# Patient Record
Sex: FEMALE | Race: OTHER | HISPANIC OR LATINO
[De-identification: names, ages, dates, MRNs, and addresses within clinical notes are randomized per-mention and may not be internally consistent; named-entity substitution may affect disease eponyms.]

---

## 2017-09-15 ENCOUNTER — APPOINTMENT (OUTPATIENT)
Dept: VASCULAR SURGERY | Facility: CLINIC | Age: 42
End: 2017-09-15
Payer: MEDICAID

## 2017-09-15 VITALS
HEART RATE: 75 BPM | HEIGHT: 63 IN | TEMPERATURE: 75 F | RESPIRATION RATE: 14 BRPM | DIASTOLIC BLOOD PRESSURE: 65 MMHG | WEIGHT: 220 LBS | SYSTOLIC BLOOD PRESSURE: 103 MMHG | BODY MASS INDEX: 38.98 KG/M2 | OXYGEN SATURATION: 97 %

## 2017-09-15 PROCEDURE — 93970 EXTREMITY STUDY: CPT

## 2017-09-15 PROCEDURE — 99203 OFFICE O/P NEW LOW 30 MIN: CPT | Mod: 25

## 2017-09-15 RX ORDER — BUDESONIDE AND FORMOTEROL FUMARATE DIHYDRATE 160; 4.5 UG/1; UG/1
AEROSOL RESPIRATORY (INHALATION)
Refills: 0 | Status: ACTIVE | COMMUNITY

## 2017-10-19 ENCOUNTER — APPOINTMENT (OUTPATIENT)
Dept: ANTEPARTUM | Facility: CLINIC | Age: 42
End: 2017-10-19

## 2017-11-21 ENCOUNTER — APPOINTMENT (OUTPATIENT)
Dept: VASCULAR SURGERY | Facility: CLINIC | Age: 42
End: 2017-11-21
Payer: MEDICAID

## 2017-11-21 VITALS
HEART RATE: 74 BPM | RESPIRATION RATE: 14 BRPM | SYSTOLIC BLOOD PRESSURE: 93 MMHG | DIASTOLIC BLOOD PRESSURE: 63 MMHG | TEMPERATURE: 98.2 F | OXYGEN SATURATION: 99 %

## 2017-11-21 DIAGNOSIS — Z82.49 FAMILY HISTORY OF ISCHEMIC HEART DISEASE AND OTHER DISEASES OF THE CIRCULATORY SYSTEM: ICD-10-CM

## 2017-11-21 DIAGNOSIS — Z83.3 FAMILY HISTORY OF DIABETES MELLITUS: ICD-10-CM

## 2017-11-21 PROCEDURE — 99214 OFFICE O/P EST MOD 30 MIN: CPT

## 2017-12-08 ENCOUNTER — APPOINTMENT (OUTPATIENT)
Dept: VASCULAR SURGERY | Facility: CLINIC | Age: 42
End: 2017-12-08

## 2018-02-09 ENCOUNTER — EMERGENCY (EMERGENCY)
Facility: HOSPITAL | Age: 43
LOS: 1 days | Discharge: HOME | End: 2018-02-09
Attending: EMERGENCY MEDICINE
Payer: MEDICAID

## 2018-02-09 VITALS
WEIGHT: 229.06 LBS | RESPIRATION RATE: 20 BRPM | DIASTOLIC BLOOD PRESSURE: 89 MMHG | OXYGEN SATURATION: 99 % | HEART RATE: 86 BPM | SYSTOLIC BLOOD PRESSURE: 144 MMHG | TEMPERATURE: 98 F | HEIGHT: 63 IN

## 2018-02-09 DIAGNOSIS — Z98.89 OTHER SPECIFIED POSTPROCEDURAL STATES: Chronic | ICD-10-CM

## 2018-02-09 LAB
ALBUMIN SERPL ELPH-MCNC: 4.3 G/DL — SIGNIFICANT CHANGE UP (ref 3.3–5.2)
ALP SERPL-CCNC: 82 U/L — SIGNIFICANT CHANGE UP (ref 40–120)
ALT FLD-CCNC: 15 U/L — SIGNIFICANT CHANGE UP
ANION GAP SERPL CALC-SCNC: 13 MMOL/L — SIGNIFICANT CHANGE UP (ref 5–17)
APPEARANCE UR: CLEAR — SIGNIFICANT CHANGE UP
AST SERPL-CCNC: 18 U/L — SIGNIFICANT CHANGE UP
BASOPHILS # BLD AUTO: 0 K/UL — SIGNIFICANT CHANGE UP (ref 0–0.2)
BASOPHILS NFR BLD AUTO: 0.2 % — SIGNIFICANT CHANGE UP (ref 0–2)
BILIRUB SERPL-MCNC: <0.2 MG/DL — LOW (ref 0.4–2)
BILIRUB UR-MCNC: NEGATIVE — SIGNIFICANT CHANGE UP
BUN SERPL-MCNC: 15 MG/DL — SIGNIFICANT CHANGE UP (ref 8–20)
CALCIUM SERPL-MCNC: 9.1 MG/DL — SIGNIFICANT CHANGE UP (ref 8.6–10.2)
CHLORIDE SERPL-SCNC: 102 MMOL/L — SIGNIFICANT CHANGE UP (ref 98–107)
CO2 SERPL-SCNC: 25 MMOL/L — SIGNIFICANT CHANGE UP (ref 22–29)
COLOR SPEC: YELLOW — SIGNIFICANT CHANGE UP
CREAT SERPL-MCNC: 0.57 MG/DL — SIGNIFICANT CHANGE UP (ref 0.5–1.3)
DIFF PNL FLD: NEGATIVE — SIGNIFICANT CHANGE UP
EOSINOPHIL # BLD AUTO: 0.1 K/UL — SIGNIFICANT CHANGE UP (ref 0–0.5)
EOSINOPHIL NFR BLD AUTO: 1 % — SIGNIFICANT CHANGE UP (ref 0–6)
GLUCOSE SERPL-MCNC: 110 MG/DL — SIGNIFICANT CHANGE UP (ref 70–115)
GLUCOSE UR QL: NEGATIVE MG/DL — SIGNIFICANT CHANGE UP
HCG SERPL-ACNC: <5 MIU/ML — SIGNIFICANT CHANGE UP
HCT VFR BLD CALC: 37.4 % — SIGNIFICANT CHANGE UP (ref 37–47)
HGB BLD-MCNC: 12 G/DL — SIGNIFICANT CHANGE UP (ref 12–16)
KETONES UR-MCNC: NEGATIVE — SIGNIFICANT CHANGE UP
LEUKOCYTE ESTERASE UR-ACNC: NEGATIVE — SIGNIFICANT CHANGE UP
LYMPHOCYTES # BLD AUTO: 15.9 % — LOW (ref 20–55)
LYMPHOCYTES # BLD AUTO: 2.1 K/UL — SIGNIFICANT CHANGE UP (ref 1–4.8)
MCHC RBC-ENTMCNC: 25 PG — LOW (ref 27–31)
MCHC RBC-ENTMCNC: 32.1 G/DL — SIGNIFICANT CHANGE UP (ref 32–36)
MCV RBC AUTO: 77.9 FL — LOW (ref 81–99)
MONOCYTES # BLD AUTO: 0.7 K/UL — SIGNIFICANT CHANGE UP (ref 0–0.8)
MONOCYTES NFR BLD AUTO: 5.4 % — SIGNIFICANT CHANGE UP (ref 3–10)
NEUTROPHILS # BLD AUTO: 10.2 K/UL — HIGH (ref 1.8–8)
NEUTROPHILS NFR BLD AUTO: 77.2 % — HIGH (ref 37–73)
NITRITE UR-MCNC: NEGATIVE — SIGNIFICANT CHANGE UP
PH UR: 6 — SIGNIFICANT CHANGE UP (ref 5–8)
PLATELET # BLD AUTO: 330 K/UL — SIGNIFICANT CHANGE UP (ref 150–400)
POTASSIUM SERPL-MCNC: 4.5 MMOL/L — SIGNIFICANT CHANGE UP (ref 3.5–5.3)
POTASSIUM SERPL-SCNC: 4.5 MMOL/L — SIGNIFICANT CHANGE UP (ref 3.5–5.3)
PROT SERPL-MCNC: 7 G/DL — SIGNIFICANT CHANGE UP (ref 6.6–8.7)
PROT UR-MCNC: NEGATIVE MG/DL — SIGNIFICANT CHANGE UP
RBC # BLD: 4.8 M/UL — SIGNIFICANT CHANGE UP (ref 4.4–5.2)
RBC # FLD: 14.4 % — SIGNIFICANT CHANGE UP (ref 11–15.6)
SODIUM SERPL-SCNC: 140 MMOL/L — SIGNIFICANT CHANGE UP (ref 135–145)
SP GR SPEC: 1.01 — SIGNIFICANT CHANGE UP (ref 1.01–1.02)
UROBILINOGEN FLD QL: NEGATIVE MG/DL — SIGNIFICANT CHANGE UP
WBC # BLD: 13.2 K/UL — HIGH (ref 4.8–10.8)
WBC # FLD AUTO: 13.2 K/UL — HIGH (ref 4.8–10.8)

## 2018-02-09 PROCEDURE — 99285 EMERGENCY DEPT VISIT HI MDM: CPT

## 2018-02-09 PROCEDURE — 76856 US EXAM PELVIC COMPLETE: CPT | Mod: 26

## 2018-02-09 PROCEDURE — 76830 TRANSVAGINAL US NON-OB: CPT | Mod: 26

## 2018-02-09 RX ORDER — KETOROLAC TROMETHAMINE 30 MG/ML
15 SYRINGE (ML) INJECTION ONCE
Qty: 0 | Refills: 0 | Status: DISCONTINUED | OUTPATIENT
Start: 2018-02-09 | End: 2018-02-09

## 2018-02-09 RX ADMIN — Medication 15 MILLIGRAM(S): at 20:24

## 2018-02-09 NOTE — ED STATDOCS - ATTENDING CONTRIBUTION TO CARE
I, Bruna Cantrell, performed the initial face to face bedside interview with this patient regarding history of present illness, review of symptoms and relevant past medical, social and family history.  I completed an independent physical examination.  I was the initial provider who evaluated this patient. I have signed out the follow up of any pending tests (i.e. labs, radiological studies) to the ACP.  I have communicated the patient’s plan of care and disposition with the ACP.

## 2018-02-09 NOTE — ED ADULT TRIAGE NOTE - CHIEF COMPLAINT QUOTE
abd. pain x 3 weeks, went to gyn was sent for sono and ct scan  yesterday but doesn't have result, states cant wait pain is  too much

## 2018-02-09 NOTE — ED STATDOCS - PHYSICAL EXAMINATION
Const: Awake, alert and oriented. In no acute distress. Well appearing.  HEENT: NC/AT. Moist mucous membranes.  Eyes: No scleral icterus. EOMI.  Neck:. Soft and supple. Full ROM without pain.  Cardiac: Regular rate and rhythm. +S1/S2. No murmurs. Peripheral pulses 2+ and symmetric. No LE edema.  Resp: Speaking in full sentences. No evidence of respiratory distress. No wheezes, rales or rhonchi.  Abd: Soft, non-distended. + suprapubic and RLQ tenderness to palpation. Normal bowel sounds in all 4 quadrants. No guarding or rebound.  Back: Spine midline and non-tender. No CVAT.  Skin: No rashes, abrasions or lacerations.  Lymph: No cervical lymphadenopathy.

## 2018-02-09 NOTE — ED STATDOCS - NS ED ROS FT
Const: Denies fever, chills  HEENT: Denies blurry vision, sore throat  Neck: Denies neck pain/stiffness  Resp: Denies coughing, SOB  Cardiovascular: Denies CP, palpitations, LE edema  GI: + Abdominal pain, + diarrhea (watery - resolved) Denies nausea, vomiting, constipation, blood in stool  : Denies urinary frequency/urgency/dysuria, hematuria  MSK: Denies back pain  Neuro: Denies HA, dizziness, numbness, weakness  Skin: Denies rashes.

## 2018-02-09 NOTE — ED STATDOCS - PROGRESS NOTE DETAILS
Pt tolerated Burger Mirza, no complaints at this time. US results discussed with PT. Pt pain decreased with medication. PT reassessed, abdomen benign non tender, soft. results discussed with PT. PT states she has close follow up with Segun KOO and will see MD tomorrow. PT verbalized understanding of diagnosis and importance of follow up at [facility]D. PT educated on importance of follow up and when to return to the ED.

## 2018-02-09 NOTE — ED STATDOCS - OBJECTIVE STATEMENT
Patient with PMH anemia (never requiring blood transfusions), and DM presents complaining of 3 weeks of suprapubic and RLQ abdominal pain which has been persistent, not worsening. She saw her GYN who had an outpatient CT performed yesterday and recommended that the patient follow up in the office on Monday for the results, however the patient feels she cannot wait that long. Her pain is mildly improved with Advil 600 mg q6 h. She notes 2 days of watery diarrhea yesterday, denies fevers, nausea, vomiting, change in appetite, urinary symptoms, back pain, vaginal bleeding or discharge. She notes intolerance of percocet (vomiting), denies smoking, EtOH or illicit drugs.

## 2018-02-09 NOTE — ED STATDOCS - MEDICAL DECISION MAKING DETAILS
Patient presents with 3 weeks of suprapubic/RLQ abdominal pain which has been persistent, no fevers, vomiting, but did have watery diarrhea yesterday. Will check labs, do CT A/P as well as US pelvic and give toradol for pain.

## 2018-02-10 LAB
CULTURE RESULTS: SIGNIFICANT CHANGE UP
SPECIMEN SOURCE: SIGNIFICANT CHANGE UP

## 2018-02-10 PROCEDURE — 74177 CT ABD & PELVIS W/CONTRAST: CPT

## 2018-02-10 PROCEDURE — 81001 URINALYSIS AUTO W/SCOPE: CPT

## 2018-02-10 PROCEDURE — 99284 EMERGENCY DEPT VISIT MOD MDM: CPT | Mod: 25

## 2018-02-10 PROCEDURE — 85027 COMPLETE CBC AUTOMATED: CPT

## 2018-02-10 PROCEDURE — 76856 US EXAM PELVIC COMPLETE: CPT

## 2018-02-10 PROCEDURE — 87086 URINE CULTURE/COLONY COUNT: CPT

## 2018-02-10 PROCEDURE — 96374 THER/PROPH/DIAG INJ IV PUSH: CPT

## 2018-02-10 PROCEDURE — 80053 COMPREHEN METABOLIC PANEL: CPT

## 2018-02-10 PROCEDURE — 74177 CT ABD & PELVIS W/CONTRAST: CPT | Mod: 26

## 2018-02-10 PROCEDURE — 84702 CHORIONIC GONADOTROPIN TEST: CPT

## 2018-02-10 PROCEDURE — 76830 TRANSVAGINAL US NON-OB: CPT

## 2018-02-10 PROCEDURE — 36415 COLL VENOUS BLD VENIPUNCTURE: CPT

## 2018-03-01 ENCOUNTER — OUTPATIENT (OUTPATIENT)
Dept: OUTPATIENT SERVICES | Facility: HOSPITAL | Age: 43
LOS: 1 days | End: 2018-03-01
Payer: MEDICAID

## 2018-03-01 DIAGNOSIS — Z98.89 OTHER SPECIFIED POSTPROCEDURAL STATES: Chronic | ICD-10-CM

## 2018-03-01 PROCEDURE — G9001: CPT

## 2018-03-22 ENCOUNTER — OUTPATIENT (OUTPATIENT)
Dept: OUTPATIENT SERVICES | Facility: HOSPITAL | Age: 43
LOS: 1 days | End: 2018-03-22
Payer: MEDICAID

## 2018-03-22 VITALS
SYSTOLIC BLOOD PRESSURE: 118 MMHG | TEMPERATURE: 98 F | HEIGHT: 63 IN | WEIGHT: 220.46 LBS | HEART RATE: 88 BPM | DIASTOLIC BLOOD PRESSURE: 80 MMHG | RESPIRATION RATE: 18 BRPM

## 2018-03-22 DIAGNOSIS — Z87.42 PERSONAL HISTORY OF OTHER DISEASES OF THE FEMALE GENITAL TRACT: ICD-10-CM

## 2018-03-22 DIAGNOSIS — Z90.49 ACQUIRED ABSENCE OF OTHER SPECIFIED PARTS OF DIGESTIVE TRACT: Chronic | ICD-10-CM

## 2018-03-22 DIAGNOSIS — Z01.818 ENCOUNTER FOR OTHER PREPROCEDURAL EXAMINATION: ICD-10-CM

## 2018-03-22 DIAGNOSIS — Z98.89 OTHER SPECIFIED POSTPROCEDURAL STATES: Chronic | ICD-10-CM

## 2018-03-22 LAB
ANION GAP SERPL CALC-SCNC: 12 MMOL/L — SIGNIFICANT CHANGE UP (ref 5–17)
APTT BLD: 28 SEC — SIGNIFICANT CHANGE UP (ref 27.5–37.4)
BASOPHILS # BLD AUTO: 0 K/UL — SIGNIFICANT CHANGE UP (ref 0–0.2)
BASOPHILS NFR BLD AUTO: 0.1 % — SIGNIFICANT CHANGE UP (ref 0–2)
BLD GP AB SCN SERPL QL: SIGNIFICANT CHANGE UP
BUN SERPL-MCNC: 13 MG/DL — SIGNIFICANT CHANGE UP (ref 8–20)
CALCIUM SERPL-MCNC: 9.5 MG/DL — SIGNIFICANT CHANGE UP (ref 8.6–10.2)
CHLORIDE SERPL-SCNC: 99 MMOL/L — SIGNIFICANT CHANGE UP (ref 98–107)
CO2 SERPL-SCNC: 25 MMOL/L — SIGNIFICANT CHANGE UP (ref 22–29)
CREAT SERPL-MCNC: 0.47 MG/DL — LOW (ref 0.5–1.3)
EOSINOPHIL # BLD AUTO: 0.2 K/UL — SIGNIFICANT CHANGE UP (ref 0–0.5)
EOSINOPHIL NFR BLD AUTO: 1.5 % — SIGNIFICANT CHANGE UP (ref 0–6)
GLUCOSE SERPL-MCNC: 104 MG/DL — SIGNIFICANT CHANGE UP (ref 70–115)
HCT VFR BLD CALC: 39 % — SIGNIFICANT CHANGE UP (ref 37–47)
HGB BLD-MCNC: 12.3 G/DL — SIGNIFICANT CHANGE UP (ref 12–16)
INR BLD: 1.04 RATIO — SIGNIFICANT CHANGE UP (ref 0.88–1.16)
LYMPHOCYTES # BLD AUTO: 1.8 K/UL — SIGNIFICANT CHANGE UP (ref 1–4.8)
LYMPHOCYTES # BLD AUTO: 18.2 % — LOW (ref 20–55)
MCHC RBC-ENTMCNC: 24.8 PG — LOW (ref 27–31)
MCHC RBC-ENTMCNC: 31.5 G/DL — LOW (ref 32–36)
MCV RBC AUTO: 78.6 FL — LOW (ref 81–99)
MONOCYTES # BLD AUTO: 0.5 K/UL — SIGNIFICANT CHANGE UP (ref 0–0.8)
MONOCYTES NFR BLD AUTO: 5.4 % — SIGNIFICANT CHANGE UP (ref 3–10)
NEUTROPHILS # BLD AUTO: 7.4 K/UL — SIGNIFICANT CHANGE UP (ref 1.8–8)
NEUTROPHILS NFR BLD AUTO: 74.4 % — HIGH (ref 37–73)
PLATELET # BLD AUTO: 300 K/UL — SIGNIFICANT CHANGE UP (ref 150–400)
POTASSIUM SERPL-MCNC: 4.4 MMOL/L — SIGNIFICANT CHANGE UP (ref 3.5–5.3)
POTASSIUM SERPL-SCNC: 4.4 MMOL/L — SIGNIFICANT CHANGE UP (ref 3.5–5.3)
PROTHROM AB SERPL-ACNC: 11.4 SEC — SIGNIFICANT CHANGE UP (ref 9.8–12.7)
RBC # BLD: 4.96 M/UL — SIGNIFICANT CHANGE UP (ref 4.4–5.2)
RBC # FLD: 14.8 % — SIGNIFICANT CHANGE UP (ref 11–15.6)
SODIUM SERPL-SCNC: 136 MMOL/L — SIGNIFICANT CHANGE UP (ref 135–145)
TYPE + AB SCN PNL BLD: SIGNIFICANT CHANGE UP
WBC # BLD: 10 K/UL — SIGNIFICANT CHANGE UP (ref 4.8–10.8)
WBC # FLD AUTO: 10 K/UL — SIGNIFICANT CHANGE UP (ref 4.8–10.8)

## 2018-03-22 PROCEDURE — G0463: CPT

## 2018-03-22 PROCEDURE — 36415 COLL VENOUS BLD VENIPUNCTURE: CPT

## 2018-03-22 PROCEDURE — 86850 RBC ANTIBODY SCREEN: CPT

## 2018-03-22 PROCEDURE — 85610 PROTHROMBIN TIME: CPT

## 2018-03-22 PROCEDURE — 80048 BASIC METABOLIC PNL TOTAL CA: CPT

## 2018-03-22 PROCEDURE — 85730 THROMBOPLASTIN TIME PARTIAL: CPT

## 2018-03-22 PROCEDURE — 86901 BLOOD TYPING SEROLOGIC RH(D): CPT

## 2018-03-22 PROCEDURE — 85027 COMPLETE CBC AUTOMATED: CPT

## 2018-03-22 PROCEDURE — 86900 BLOOD TYPING SEROLOGIC ABO: CPT

## 2018-03-22 RX ORDER — CEFAZOLIN SODIUM 1 G
2000 VIAL (EA) INJECTION ONCE
Qty: 0 | Refills: 0 | Status: DISCONTINUED | OUTPATIENT
Start: 2018-03-27 | End: 2018-03-31

## 2018-03-22 RX ORDER — SODIUM CHLORIDE 9 MG/ML
3 INJECTION INTRAMUSCULAR; INTRAVENOUS; SUBCUTANEOUS EVERY 8 HOURS
Qty: 0 | Refills: 0 | Status: DISCONTINUED | OUTPATIENT
Start: 2018-03-28 | End: 2018-03-31

## 2018-03-22 NOTE — H&P PST ADULT - ASSESSMENT
43 year old female present to [facility] for total abdominal hysterectomy.

## 2018-03-22 NOTE — H&P PST ADULT - ATTENDING COMMENTS
Pt was seen and evaluated.  She desires TAHBSO  risks of surgery were discussed  pt does not want any further surgery and desires that everything be removed  pt understands the menopause risks as well

## 2018-03-22 NOTE — H&P PST ADULT - NSANTHOSAYNRD_GEN_A_CORE
No. TOMMY screening performed.  STOP BANG Legend: 0-2 = LOW Risk; 3-4 = INTERMEDIATE Risk; 5-8 = HIGH Risk

## 2018-03-22 NOTE — H&P PST ADULT - PSH
History of  section  . ,   History of endometrial ablation  () History of  section  . ,   History of endometrial ablation  ()  S/P appendectomy    S/P cholecystectomy

## 2018-03-27 ENCOUNTER — RESULT REVIEW (OUTPATIENT)
Age: 43
End: 2018-03-27

## 2018-03-27 ENCOUNTER — INPATIENT (INPATIENT)
Facility: HOSPITAL | Age: 43
LOS: 3 days | Discharge: HOME | DRG: 743 | End: 2018-03-31
Attending: OBSTETRICS & GYNECOLOGY | Admitting: OBSTETRICS & GYNECOLOGY
Payer: MEDICAID

## 2018-03-27 VITALS
TEMPERATURE: 99 F | WEIGHT: 220.46 LBS | OXYGEN SATURATION: 100 % | HEART RATE: 95 BPM | DIASTOLIC BLOOD PRESSURE: 66 MMHG | SYSTOLIC BLOOD PRESSURE: 125 MMHG | HEIGHT: 63 IN | RESPIRATION RATE: 18 BRPM

## 2018-03-27 DIAGNOSIS — Z90.49 ACQUIRED ABSENCE OF OTHER SPECIFIED PARTS OF DIGESTIVE TRACT: Chronic | ICD-10-CM

## 2018-03-27 DIAGNOSIS — R10.2 PELVIC AND PERINEAL PAIN: ICD-10-CM

## 2018-03-27 DIAGNOSIS — Z98.89 OTHER SPECIFIED POSTPROCEDURAL STATES: Chronic | ICD-10-CM

## 2018-03-27 LAB
GLUCOSE BLDC GLUCOMTR-MCNC: 109 MG/DL — HIGH (ref 70–99)
GLUCOSE BLDC GLUCOMTR-MCNC: 129 MG/DL — HIGH (ref 70–99)
GLUCOSE BLDC GLUCOMTR-MCNC: 145 MG/DL — HIGH (ref 70–99)

## 2018-03-27 PROCEDURE — 88305 TISSUE EXAM BY PATHOLOGIST: CPT | Mod: 26

## 2018-03-27 PROCEDURE — 88307 TISSUE EXAM BY PATHOLOGIST: CPT | Mod: 26

## 2018-03-27 PROCEDURE — 58180 PARTIAL HYSTERECTOMY: CPT | Mod: 80

## 2018-03-27 RX ORDER — SODIUM CHLORIDE 9 MG/ML
1000 INJECTION, SOLUTION INTRAVENOUS
Qty: 0 | Refills: 0 | Status: DISCONTINUED | OUTPATIENT
Start: 2018-03-28 | End: 2018-03-30

## 2018-03-27 RX ORDER — FENTANYL CITRATE 50 UG/ML
50 INJECTION INTRAVENOUS
Qty: 0 | Refills: 0 | Status: DISCONTINUED | OUTPATIENT
Start: 2018-03-27 | End: 2018-03-27

## 2018-03-27 RX ORDER — ALBUTEROL 90 UG/1
2.5 AEROSOL, METERED ORAL EVERY 6 HOURS
Qty: 0 | Refills: 0 | Status: DISCONTINUED | OUTPATIENT
Start: 2018-03-27 | End: 2018-03-31

## 2018-03-27 RX ORDER — IPRATROPIUM/ALBUTEROL SULFATE 18-103MCG
3 AEROSOL WITH ADAPTER (GRAM) INHALATION EVERY 12 HOURS
Qty: 0 | Refills: 0 | Status: DISCONTINUED | OUTPATIENT
Start: 2018-03-27 | End: 2018-03-31

## 2018-03-27 RX ORDER — SODIUM CHLORIDE 9 MG/ML
1000 INJECTION, SOLUTION INTRAVENOUS
Qty: 0 | Refills: 0 | Status: DISCONTINUED | OUTPATIENT
Start: 2018-03-27 | End: 2018-03-27

## 2018-03-27 RX ORDER — TRAMADOL HYDROCHLORIDE 50 MG/1
50 TABLET ORAL EVERY 4 HOURS
Qty: 0 | Refills: 0 | Status: DISCONTINUED | OUTPATIENT
Start: 2018-03-28 | End: 2018-03-31

## 2018-03-27 RX ORDER — ONDANSETRON 8 MG/1
4 TABLET, FILM COATED ORAL ONCE
Qty: 0 | Refills: 0 | Status: DISCONTINUED | OUTPATIENT
Start: 2018-03-27 | End: 2018-03-27

## 2018-03-27 RX ORDER — FENTANYL CITRATE 50 UG/ML
30 INJECTION INTRAVENOUS
Qty: 0 | Refills: 0 | Status: DISCONTINUED | OUTPATIENT
Start: 2018-03-27 | End: 2018-03-27

## 2018-03-27 RX ORDER — IBUPROFEN 200 MG
600 TABLET ORAL EVERY 6 HOURS
Qty: 0 | Refills: 0 | Status: DISCONTINUED | OUTPATIENT
Start: 2018-03-28 | End: 2018-03-31

## 2018-03-27 RX ADMIN — FENTANYL CITRATE 30 MILLILITER(S): 50 INJECTION INTRAVENOUS at 19:12

## 2018-03-27 RX ADMIN — FENTANYL CITRATE 50 MICROGRAM(S): 50 INJECTION INTRAVENOUS at 18:03

## 2018-03-27 RX ADMIN — FENTANYL CITRATE 50 MICROGRAM(S): 50 INJECTION INTRAVENOUS at 17:10

## 2018-03-27 RX ADMIN — FENTANYL CITRATE 50 MICROGRAM(S): 50 INJECTION INTRAVENOUS at 18:10

## 2018-03-27 RX ADMIN — FENTANYL CITRATE 30 MILLILITER(S): 50 INJECTION INTRAVENOUS at 18:51

## 2018-03-27 RX ADMIN — Medication 3 MILLILITER(S): at 20:28

## 2018-03-27 RX ADMIN — FENTANYL CITRATE 50 MICROGRAM(S): 50 INJECTION INTRAVENOUS at 17:15

## 2018-03-27 RX ADMIN — FENTANYL CITRATE 50 MICROGRAM(S): 50 INJECTION INTRAVENOUS at 17:30

## 2018-03-27 RX ADMIN — FENTANYL CITRATE 30 MILLILITER(S): 50 INJECTION INTRAVENOUS at 17:11

## 2018-03-27 NOTE — CONSULT NOTE ADULT - SUBJECTIVE AND OBJECTIVE BOX
HPI:  43 year old female present to [facility] for total abdominal hysterectomy. (22 Mar 2018 12:03)    Home Medications:   · 	Iron 100 Plus oral tablet: Last Dose Taken:  ,  orally 2 times a day  · 	omeprazole 40 mg oral delayed release capsule: Last Dose Taken:  , 1 cap(s) orally once a day  · 	Ventolin HFA 90 mcg/inh inhalation aerosol: Last Dose Taken:  , 2 puff(s) inhaled 4 times a day    PAST MEDICAL & SURGICAL HISTORY:  Anemia  Murmur: childhood  Migraines  Asthma: &quot;never had attack&quot;  S/P cholecystectomy  S/P appendectomy  History of endometrial ablation: ()  History of  section: . ,     ceFAZolin   IVPB 2000 milliGRAM(s) IV Intermittent once  fentaNYL    Injectable 50 MICROGram(s) IV Push every 5 minutes PRN  fentaNYL PCA (50 MICROgram(s)/mL) 30 milliLiter(s) PCA Continuous PCA Continuous  lactated ringers. 1000 milliLiter(s) IV Continuous <Continuous>  ondansetron Injectable 4 milliGRAM(s) IV Push once PRN    MEDICATIONS  (STANDING):  ceFAZolin   IVPB 2000 milliGRAM(s) IV Intermittent once  fentaNYL PCA (50 MICROgram(s)/mL) 30 milliLiter(s) PCA Continuous PCA Continuous  lactated ringers. 1000 milliLiter(s) (100 mL/Hr) IV Continuous <Continuous>    MEDICATIONS  (PRN):  fentaNYL    Injectable 50 MICROGram(s) IV Push every 5 minutes PRN Severe Pain  ondansetron Injectable 4 milliGRAM(s) IV Push once PRN Nausea and/or Vomiting      Allergies    No Known Allergies    Intolerances    Percocet 5/325 (Vomiting; Nausea)      SOCIAL HISTORY:  No S/D/IVDU    FAMILY HISTORY:  No pertinent family history          ROS  - Headache  - Neck Stiffness  - Chest Pain  - SOB  + MILD Abd pain  - Pelvic Pain  - Leg Pain        Vital Signs Last 24 Hrs  T(C): 36.5 (27 Mar 2018 18:15), Max: 37.1 (27 Mar 2018 12:38)  T(F): 97.7 (27 Mar 2018 18:15), Max: 98.8 (27 Mar 2018 12:38)  HR: 71 (27 Mar 2018 18:15) (70 - 95)  BP: 115/75 (27 Mar 2018 18:15) (113/94 - 133/77)  BP(mean): --  RR: 15 (27 Mar 2018 18:15) (14 - 18)  SpO2: 100% (27 Mar 2018 18:15) (99% - 100%)      HEENT: PEARLA  Neck: Supple  Cardio: S1 S2 1/6 SE Murmur  Pulm: CTA No Rales Occ Ronchi  Abd: Soft NT ND BS dec, midline incision clean  Rectal Pelvic Gyn- refused  Ext: No DCT  Skin: No Rash  Neuro: Awake Pleasant    Asthma - Duoneb with Albuterol Prn  Anemia - Follow Hb

## 2018-03-28 LAB
HCT VFR BLD CALC: 33.9 % — LOW (ref 37–47)
HGB BLD-MCNC: 10.8 G/DL — LOW (ref 12–16)
MCHC RBC-ENTMCNC: 24.8 PG — LOW (ref 27–31)
MCHC RBC-ENTMCNC: 31.9 G/DL — LOW (ref 32–36)
MCV RBC AUTO: 77.8 FL — LOW (ref 81–99)
PLATELET # BLD AUTO: 305 K/UL — SIGNIFICANT CHANGE UP (ref 150–400)
RBC # BLD: 4.36 M/UL — LOW (ref 4.4–5.2)
RBC # FLD: 14.7 % — SIGNIFICANT CHANGE UP (ref 11–15.6)
WBC # BLD: 14.1 K/UL — HIGH (ref 4.8–10.8)
WBC # FLD AUTO: 14.1 K/UL — HIGH (ref 4.8–10.8)

## 2018-03-28 RX ADMIN — Medication 3 MILLILITER(S): at 20:34

## 2018-03-28 RX ADMIN — TRAMADOL HYDROCHLORIDE 50 MILLIGRAM(S): 50 TABLET ORAL at 16:28

## 2018-03-28 RX ADMIN — SODIUM CHLORIDE 3 MILLILITER(S): 9 INJECTION INTRAMUSCULAR; INTRAVENOUS; SUBCUTANEOUS at 12:46

## 2018-03-28 RX ADMIN — TRAMADOL HYDROCHLORIDE 50 MILLIGRAM(S): 50 TABLET ORAL at 19:09

## 2018-03-28 RX ADMIN — SODIUM CHLORIDE 3 MILLILITER(S): 9 INJECTION INTRAMUSCULAR; INTRAVENOUS; SUBCUTANEOUS at 21:06

## 2018-03-28 RX ADMIN — Medication 3 MILLILITER(S): at 09:30

## 2018-03-28 RX ADMIN — SODIUM CHLORIDE 125 MILLILITER(S): 9 INJECTION, SOLUTION INTRAVENOUS at 23:02

## 2018-03-28 NOTE — PROGRESS NOTE ADULT - ASSESSMENT
Mary will now be dc'd  pt will be encouraged to ambulate  her surgery was late in the day, as a reason to keep the mary in place  I motivated her

## 2018-03-28 NOTE — PROGRESS NOTE ADULT - SUBJECTIVE AND OBJECTIVE BOX
HPI:  43 year old female present to [facility] for total abdominal hysterectomy. (22 Mar 2018 12:03)     Allergies    No Known Allergies    Intolerances    Percocet 5/325 (Vomiting; Nausea)    Anemia  Insomnia  Diabetes  Murmur  Migraines  Asthma    MEDICATIONS  (STANDING):  ALBUTerol/ipratropium for Nebulization 3 milliLiter(s) Nebulizer every 12 hours  ceFAZolin   IVPB 2000 milliGRAM(s) IV Intermittent once  lactated ringers. 1000 milliLiter(s) (125 mL/Hr) IV Continuous <Continuous>  sodium chloride 0.9% lock flush 3 milliLiter(s) IV Push every 8 hours    MEDICATIONS  (PRN):  ALBUTerol    0.083% 2.5 milliGRAM(s) Nebulizer every 6 hours PRN Bronchospasm  ibuprofen  Tablet 600 milliGRAM(s) Oral every 6 hours PRN Mild Pain  traMADol 50 milliGRAM(s) Oral every 4 hours PRN Moderate Pain (4 - 6)                           10.8   14.1  )-----------( 305      ( 28 Mar 2018 10:31 )             33.9             ;  Vital Signs Last 24 Hrs  T(C): 37 (28 Mar 2018 15:59), Max: 37 (28 Mar 2018 15:59)  T(F): 98.6 (28 Mar 2018 15:59), Max: 98.6 (28 Mar 2018 15:59)  HR: 84 (28 Mar 2018 15:59) (73 - 104)  BP: 110/71 (28 Mar 2018 15:59) (104/67 - 113/65)  BP(mean): --  RR: 18 (28 Mar 2018 15:59) (18 - 20)  SpO2: 99% (28 Mar 2018 09:14) (99% - 100%)  CAPILLARY BLOOD GLUCOSE      03-27 @ 07:01  -  03-28 @ 07:00  --------------------------------------------------------  IN: 0 mL / OUT: 1050 mL / NET: -1050 mL    03-28 @ 07:01  - 03-28 @ 19:50  --------------------------------------------------------  IN: 0 mL / OUT: 1500 mL / NET: -1500 mL      Patient feeling better No CP, No SOB, No N/V mild Abd pain    HEENT: PEARLA  Neck: Supple  Cardio: S1 S2 1/6 SE Murmur  Pulm: CTA No Rales Occ Ronchi Good air entry  Abd: Soft NT ND BS+, midline incision clean  Rectal Pelvic Gyn- refused  Ext: No DCT  Skin: No Rash  Neuro: Awake Pleasant    Asthma - Duoneb with Albuterol Prn  Anemia - Follow Hb mild

## 2018-03-28 NOTE — PROGRESS NOTE ADULT - ASSESSMENT
Abdominal hysterectomy, Day: 1    She feels well  Continue the current pain medication  Encourage  Ambulation  Encourage regular diet   DVT ppx: SCDs when not ambulating  She is stable, tolerates a diet and has normal flatus and bowel movements  She will be discharged on Day 2,3, or 4    according to the normal criteria. Abdominal hysterectomy, Day: 1    She feels well  Continue the current pain medication  Encourage  Ambulation  Encourage regular diet   DVT ppx: SCDs when not ambulating  She is stable, tolerates a diet and has normal flatus    She will be discharged on Day 2,3, or 4    according to the normal criteria.

## 2018-03-28 NOTE — PROGRESS NOTE ADULT - SUBJECTIVE AND OBJECTIVE BOX
This is a 43y woman who is s/p Abdominal Hysterectomy    She is now postoperative day: 1    Subjective:  The patient feels well.  She is ambulating and tolerating a diet  She is having bowel movements and flatus  She reports no breathing problems  She reports no headache or visual changes    Physical exam:  She generally looks and feels well    Vital Signs Last 24 Hrs  T(C): 36.8 (27 Mar 2018 23:10), Max: 37.1 (27 Mar 2018 12:38)  T(F): 98.3 (27 Mar 2018 23:10), Max: 98.8 (27 Mar 2018 12:38)  HR: 96 (27 Mar 2018 23:10) (70 - 96)  BP: 104/67 (27 Mar 2018 23:10) (96/64 - 133/77)  BP(mean): --  RR: 20 (27 Mar 2018 23:10) (14 - 20)  SpO2: 99% (27 Mar 2018 23:10) (99% - 100%)    Lungs: Normal  Heart: Regular rate and rhythm  Abdomen: Soft, nontender, no distension , the incision is clean dry and intact  Pelvic: Normal vaginal spotting noted  Ext: No DVT signs, warm extremities, normal pulses    LABS:  cbc pending for today            Allergies    No Known Allergies    Intolerances    Percocet 5/325 (Vomiting; Nausea)    MEDICATIONS  (STANDING):  ALBUTerol/ipratropium for Nebulization 3 milliLiter(s) Nebulizer every 12 hours  ceFAZolin   IVPB 2000 milliGRAM(s) IV Intermittent once  fentaNYL PCA (50 MICROgram(s)/mL) 30 milliLiter(s) PCA Continuous PCA Continuous  lactated ringers. 1000 milliLiter(s) (125 mL/Hr) IV Continuous <Continuous>  sodium chloride 0.9% lock flush 3 milliLiter(s) IV Push every 8 hours    MEDICATIONS  (PRN):  ALBUTerol    0.083% 2.5 milliGRAM(s) Nebulizer every 6 hours PRN Bronchospasm  ibuprofen  Tablet 600 milliGRAM(s) Oral every 6 hours PRN Mild Pain  traMADol 50 milliGRAM(s) Oral every 4 hours PRN Moderate Pain (4 - 6) This is a 43y woman who is s/p Abdominal Hysterectomy    She is now postoperative day: 1    Subjective:  The patient feels well.  She is  tolerating a diet  She is having  flatus  She reports no breathing problems  She reports no headache or visual changes    Physical exam:  She generally looks and feels well    Vital Signs Last 24 Hrs  T(C): 36.8 (27 Mar 2018 23:10), Max: 37.1 (27 Mar 2018 12:38)  T(F): 98.3 (27 Mar 2018 23:10), Max: 98.8 (27 Mar 2018 12:38)  HR: 96 (27 Mar 2018 23:10) (70 - 96)  BP: 104/67 (27 Mar 2018 23:10) (96/64 - 133/77)  BP(mean): --  RR: 20 (27 Mar 2018 23:10) (14 - 20)  SpO2: 99% (27 Mar 2018 23:10) (99% - 100%)    Lungs: Normal  Heart: Regular rate and rhythm  Abdomen: Soft, nontender, no distension , the incision is clean dry and intact  Pelvic: Normal vaginal spotting noted  Ext: No DVT signs, warm extremities, normal pulses    LABS:  cbc pending for today            Allergies    No Known Allergies    Intolerances    Percocet 5/325 (Vomiting; Nausea)    MEDICATIONS  (STANDING):  ALBUTerol/ipratropium for Nebulization 3 milliLiter(s) Nebulizer every 12 hours  ceFAZolin   IVPB 2000 milliGRAM(s) IV Intermittent once  fentaNYL PCA (50 MICROgram(s)/mL) 30 milliLiter(s) PCA Continuous PCA Continuous  lactated ringers. 1000 milliLiter(s) (125 mL/Hr) IV Continuous <Continuous>  sodium chloride 0.9% lock flush 3 milliLiter(s) IV Push every 8 hours    MEDICATIONS  (PRN):  ALBUTerol    0.083% 2.5 milliGRAM(s) Nebulizer every 6 hours PRN Bronchospasm  ibuprofen  Tablet 600 milliGRAM(s) Oral every 6 hours PRN Mild Pain  traMADol 50 milliGRAM(s) Oral every 4 hours PRN Moderate Pain (4 - 6)

## 2018-03-28 NOTE — PROGRESS NOTE ADULT - SUBJECTIVE AND OBJECTIVE BOX
This is a 43y woman who is s/p Abdominal Hysterectomy    She is now postoperative day: 1    Subjective:  The patient feels well.  She is not yet ambulating but she tolerating a diet  She is having flatus  She reports no breathing problems  She reports no headache or visual changes    Physical exam:  She generally looks and feels well    Vital Signs Last 24 Hrs  T(C): 37 (28 Mar 2018 15:59), Max: 37 (28 Mar 2018 15:59)  T(F): 98.6 (28 Mar 2018 15:59), Max: 98.6 (28 Mar 2018 15:59)  HR: 84 (28 Mar 2018 15:59) (71 - 104)  BP: 110/71 (28 Mar 2018 15:59) (96/64 - 115/75)  BP(mean): --  RR: 18 (28 Mar 2018 15:59) (15 - 20)  SpO2: 99% (28 Mar 2018 09:14) (99% - 100%)    Lungs: Normal  Heart: Regular rate and rhythm  Abdomen: Soft, nontender, no distension , the incision is clean dry and intact  Pelvic: Normal vaginal spotting noted  Ext: No DVT signs, warm extremities, normal pulses    LABS:                        10.8   14.1  )-----------( 305      ( 28 Mar 2018 10:31 )             33.9         Allergies    No Known Allergies    Intolerances    Percocet 5/325 (Vomiting; Nausea)    MEDICATIONS  (STANDING):  ALBUTerol/ipratropium for Nebulization 3 milliLiter(s) Nebulizer every 12 hours  ceFAZolin   IVPB 2000 milliGRAM(s) IV Intermittent once  fentaNYL PCA (50 MICROgram(s)/mL) 30 milliLiter(s) PCA Continuous PCA Continuous  lactated ringers. 1000 milliLiter(s) (125 mL/Hr) IV Continuous <Continuous>  sodium chloride 0.9% lock flush 3 milliLiter(s) IV Push every 8 hours    MEDICATIONS  (PRN):  ALBUTerol    0.083% 2.5 milliGRAM(s) Nebulizer every 6 hours PRN Bronchospasm  ibuprofen  Tablet 600 milliGRAM(s) Oral every 6 hours PRN Mild Pain  traMADol 50 milliGRAM(s) Oral every 4 hours PRN Moderate Pain (4 - 6)

## 2018-03-28 NOTE — PROGRESS NOTE ADULT - SUBJECTIVE AND OBJECTIVE BOX
pt pod 1 sp abdominal hysterectomy under GETA. Tolerated well. Denies any A/c.   pt with no n/v @ this time. using pain meds as ordered/needed with some pain relief. vss. spont vent. no issues with anesthesia at this time. pt resting comfortably @ this time.

## 2018-03-29 DIAGNOSIS — R69 ILLNESS, UNSPECIFIED: ICD-10-CM

## 2018-03-29 RX ADMIN — TRAMADOL HYDROCHLORIDE 50 MILLIGRAM(S): 50 TABLET ORAL at 21:19

## 2018-03-29 RX ADMIN — TRAMADOL HYDROCHLORIDE 50 MILLIGRAM(S): 50 TABLET ORAL at 17:10

## 2018-03-29 RX ADMIN — TRAMADOL HYDROCHLORIDE 50 MILLIGRAM(S): 50 TABLET ORAL at 21:59

## 2018-03-29 RX ADMIN — Medication 3 MILLILITER(S): at 09:15

## 2018-03-29 RX ADMIN — SODIUM CHLORIDE 125 MILLILITER(S): 9 INJECTION, SOLUTION INTRAVENOUS at 21:21

## 2018-03-29 RX ADMIN — SODIUM CHLORIDE 3 MILLILITER(S): 9 INJECTION INTRAMUSCULAR; INTRAVENOUS; SUBCUTANEOUS at 11:03

## 2018-03-29 RX ADMIN — Medication 3 MILLILITER(S): at 21:00

## 2018-03-29 RX ADMIN — TRAMADOL HYDROCHLORIDE 50 MILLIGRAM(S): 50 TABLET ORAL at 18:17

## 2018-03-29 RX ADMIN — SODIUM CHLORIDE 125 MILLILITER(S): 9 INJECTION, SOLUTION INTRAVENOUS at 05:33

## 2018-03-29 RX ADMIN — SODIUM CHLORIDE 3 MILLILITER(S): 9 INJECTION INTRAMUSCULAR; INTRAVENOUS; SUBCUTANEOUS at 21:04

## 2018-03-29 RX ADMIN — SODIUM CHLORIDE 3 MILLILITER(S): 9 INJECTION INTRAMUSCULAR; INTRAVENOUS; SUBCUTANEOUS at 05:31

## 2018-03-29 NOTE — PROGRESS NOTE ADULT - ASSESSMENT
Abdominal hysterectomy, Day: 2    She feels well most of the time but still has occasional significant pain  Continue the current pain medication  Encourage  Ambulation  Encourage regular diet   DVT ppx: SCDs when not ambulating  She is stable, tolerates a diet and has normal flatus and bowel movements  She will be discharged tomorrow, day 3     according to the normal criteria.

## 2018-03-29 NOTE — PROGRESS NOTE ADULT - SUBJECTIVE AND OBJECTIVE BOX
This is a 43y woman who is s/p Abdominal Hysterectomy    She is now postoperative day: 2    Subjective:  The patient feels well most of the time but is still in more pain than normal  She is ambulating and tolerating a diet  She is having bowel movements and flatus  She reports no breathing problems  She reports no headache or visual changes    Physical exam:  She generally looks and feels well    Vital Signs Last 24 Hrs  T(C): 36.8 (29 Mar 2018 15:25), Max: 37.1 (28 Mar 2018 20:00)  T(F): 98.2 (29 Mar 2018 15:25), Max: 98.7 (28 Mar 2018 20:00)  HR: 91 (29 Mar 2018 15:25) (82 - 98)  BP: 114/81 (29 Mar 2018 15:25) (98/60 - 114/81)  BP(mean): --  RR: 18 (29 Mar 2018 15:25) (18 - 18)  SpO2: 93% (29 Mar 2018 15:25) (93% - 98%)    Lungs: Normal  Heart: Regular rate and rhythm  Abdomen: Soft, nontender, no distension , the incision is clean dry and intact  Pelvic: Normal vaginal spotting noted  Ext: No DVT signs, warm extremities, normal pulses    LABS:                        10.8   14.1  )-----------( 305      ( 28 Mar 2018 10:31 )             33.9                 Allergies    No Known Allergies    Intolerances    Percocet 5/325 (Vomiting; Nausea)    MEDICATIONS  (STANDING):  ALBUTerol/ipratropium for Nebulization 3 milliLiter(s) Nebulizer every 12 hours  ceFAZolin   IVPB 2000 milliGRAM(s) IV Intermittent once  lactated ringers. 1000 milliLiter(s) (125 mL/Hr) IV Continuous <Continuous>  sodium chloride 0.9% lock flush 3 milliLiter(s) IV Push every 8 hours    MEDICATIONS  (PRN):  ALBUTerol    0.083% 2.5 milliGRAM(s) Nebulizer every 6 hours PRN Bronchospasm  ibuprofen  Tablet 600 milliGRAM(s) Oral every 6 hours PRN Mild Pain  traMADol 50 milliGRAM(s) Oral every 4 hours PRN Moderate Pain (4 - 6)      RADIOLOGY & ADDITIONAL TESTS:

## 2018-03-30 LAB
APPEARANCE UR: CLEAR — SIGNIFICANT CHANGE UP
BILIRUB UR-MCNC: NEGATIVE — SIGNIFICANT CHANGE UP
COLOR SPEC: YELLOW — SIGNIFICANT CHANGE UP
DIFF PNL FLD: NEGATIVE — SIGNIFICANT CHANGE UP
GLUCOSE UR QL: NEGATIVE MG/DL — SIGNIFICANT CHANGE UP
KETONES UR-MCNC: NEGATIVE — SIGNIFICANT CHANGE UP
LEUKOCYTE ESTERASE UR-ACNC: NEGATIVE — SIGNIFICANT CHANGE UP
NITRITE UR-MCNC: NEGATIVE — SIGNIFICANT CHANGE UP
PH UR: 7 — SIGNIFICANT CHANGE UP (ref 5–8)
PROT UR-MCNC: NEGATIVE MG/DL — SIGNIFICANT CHANGE UP
SP GR SPEC: 1.01 — SIGNIFICANT CHANGE UP (ref 1.01–1.02)
UROBILINOGEN FLD QL: NEGATIVE MG/DL — SIGNIFICANT CHANGE UP

## 2018-03-30 RX ORDER — LACTULOSE 10 G/15ML
20 SOLUTION ORAL EVERY 4 HOURS
Qty: 0 | Refills: 0 | Status: COMPLETED | OUTPATIENT
Start: 2018-03-30 | End: 2018-03-30

## 2018-03-30 RX ORDER — LACTULOSE 10 G/15ML
220 SOLUTION ORAL EVERY 4 HOURS
Qty: 0 | Refills: 0 | Status: DISCONTINUED | OUTPATIENT
Start: 2018-03-30 | End: 2018-03-30

## 2018-03-30 RX ADMIN — TRAMADOL HYDROCHLORIDE 50 MILLIGRAM(S): 50 TABLET ORAL at 05:31

## 2018-03-30 RX ADMIN — SODIUM CHLORIDE 3 MILLILITER(S): 9 INJECTION INTRAMUSCULAR; INTRAVENOUS; SUBCUTANEOUS at 21:52

## 2018-03-30 RX ADMIN — Medication 600 MILLIGRAM(S): at 13:51

## 2018-03-30 RX ADMIN — LACTULOSE 20 GRAM(S): 10 SOLUTION ORAL at 13:51

## 2018-03-30 RX ADMIN — SODIUM CHLORIDE 3 MILLILITER(S): 9 INJECTION INTRAMUSCULAR; INTRAVENOUS; SUBCUTANEOUS at 03:50

## 2018-03-30 RX ADMIN — SODIUM CHLORIDE 125 MILLILITER(S): 9 INJECTION, SOLUTION INTRAVENOUS at 05:32

## 2018-03-30 RX ADMIN — TRAMADOL HYDROCHLORIDE 50 MILLIGRAM(S): 50 TABLET ORAL at 22:42

## 2018-03-30 RX ADMIN — Medication 600 MILLIGRAM(S): at 14:40

## 2018-03-30 RX ADMIN — SODIUM CHLORIDE 3 MILLILITER(S): 9 INJECTION INTRAMUSCULAR; INTRAVENOUS; SUBCUTANEOUS at 14:14

## 2018-03-30 RX ADMIN — TRAMADOL HYDROCHLORIDE 50 MILLIGRAM(S): 50 TABLET ORAL at 21:51

## 2018-03-30 RX ADMIN — Medication 3 MILLILITER(S): at 08:56

## 2018-03-30 RX ADMIN — TRAMADOL HYDROCHLORIDE 50 MILLIGRAM(S): 50 TABLET ORAL at 06:10

## 2018-03-30 RX ADMIN — TRAMADOL HYDROCHLORIDE 50 MILLIGRAM(S): 50 TABLET ORAL at 17:21

## 2018-03-30 RX ADMIN — LACTULOSE 20 GRAM(S): 10 SOLUTION ORAL at 11:54

## 2018-03-30 RX ADMIN — Medication 3 MILLILITER(S): at 20:43

## 2018-03-30 NOTE — PHARMACY COMMUNICATION NOTE - COMMENTS
order for 220 grams lactulose q4h x 4 doses or one bowel movement. Spoke with Dr Georges and he wants 20 gram dose. Will re-enter order on his behalf

## 2018-03-30 NOTE — PROGRESS NOTE ADULT - ASSESSMENT
Abdominal hysterectomy, Day: 3    She feels well  Continue the current pain medication  We will give lactulose and dulcolax    Encourage  Ambulation  Encourage regular diet   DVT ppx: SCDs when not ambulating    She will be discharged on Day  4, tomorrow  She must ambulate better today.

## 2018-03-30 NOTE — PROGRESS NOTE ADULT - SUBJECTIVE AND OBJECTIVE BOX
HPI:  43 year old female present to [facility] for total abdominal hysterectomy. (22 Mar 2018 12:03)     Allergies    No Known Allergies    Intolerances    Percocet 5/325 (Vomiting; Nausea)    Anemia  Insomnia  Diabetes  Murmur  Migraines  Asthma    MEDICATIONS  (STANDING):  ALBUTerol/ipratropium for Nebulization 3 milliLiter(s) Nebulizer every 12 hours  ceFAZolin   IVPB 2000 milliGRAM(s) IV Intermittent once  sodium chloride 0.9% lock flush 3 milliLiter(s) IV Push every 8 hours    MEDICATIONS  (PRN):  ALBUTerol    0.083% 2.5 milliGRAM(s) Nebulizer every 6 hours PRN Bronchospasm  ibuprofen  Tablet 600 milliGRAM(s) Oral every 6 hours PRN Mild Pain  traMADol 50 milliGRAM(s) Oral every 4 hours PRN Moderate Pain (4 - 6)               ;  Vital Signs Last 24 Hrs  T(C): 36.4 (30 Mar 2018 16:23), Max: 36.9 (30 Mar 2018 10:00)  T(F): 97.6 (30 Mar 2018 16:23), Max: 98.4 (30 Mar 2018 10:00)  HR: 76 (30 Mar 2018 16:23) (76 - 103)  BP: 94/64 (30 Mar 2018 16:23) (94/64 - 102/67)  BP(mean): --  RR: 18 (30 Mar 2018 16:23) (18 - 18)  SpO2: 98% (30 Mar 2018 16:23) (95% - 98%)  CAPILLARY BLOOD GLUCOSE      03-29 @ 07:01  -  03-30 @ 07:00  --------------------------------------------------------  IN: 2625 mL / OUT: 2200 mL / NET: 425 mL      Patient feeling better No CP, No SOB, No N/V min Abd pain    HEENT: PEARLA  Neck: Supple  Cardio: S1 S2 1/6 SE Murmur  Pulm: CTA No Rales No Ronchi Good air entry  Abd: Soft NT ND BS+, midline incision clean  Rectal Pelvic Gyn- refused  Ext: No DCT  Skin: No Rash  Neuro: Awake Pleasant    Asthma - Duoneb with Albuterol Prn  Anemia - Follow Hb mild  Hysterectomy - Promoted ambulation  Will sign off

## 2018-03-30 NOTE — PROGRESS NOTE ADULT - SUBJECTIVE AND OBJECTIVE BOX
This is a 43y woman who is s/p Abdominal Hysterectomy    She is now postoperative day: 3    Subjective:  The patient feels well but she is not ambulating well  She is ambulating slowly and tolerating a diet  She is having not yet having bowel movements    She reports no breathing problems  She reports no headache or visual changes    Physical exam:  She generally looks and feels well    Vital Signs Last 24 Hrs  T(C): 36.8 (29 Mar 2018 15:25), Max: 36.8 (29 Mar 2018 15:25)  T(F): 98.2 (29 Mar 2018 15:25), Max: 98.2 (29 Mar 2018 15:25)  HR: 85 (29 Mar 2018 21:13) (85 - 98)  BP: 114/81 (29 Mar 2018 15:25) (98/60 - 114/81)  BP(mean): --  RR: 18 (29 Mar 2018 15:25) (18 - 18)  SpO2: 97% (29 Mar 2018 21:13) (93% - 98%)    Lungs: Normal  Heart: Regular rate and rhythm  Abdomen: Soft, nontender, no distension , the incision is clean dry and intact, vertical scar    Pelvic: Normal vaginal spotting noted  Ext: No DVT signs, warm extremities, normal pulses    LABS:                        10.8   14.1  )-----------( 305      ( 28 Mar 2018 10:31 )             33.9                 Allergies    No Known Allergies    Intolerances    Percocet 5/325 (Vomiting; Nausea)    MEDICATIONS  (STANDING):  ALBUTerol/ipratropium for Nebulization 3 milliLiter(s) Nebulizer every 12 hours  ceFAZolin   IVPB 2000 milliGRAM(s) IV Intermittent once  lactated ringers. 1000 milliLiter(s) (125 mL/Hr) IV Continuous <Continuous>  lactulose Syrup 220 Gram(s) Oral every 4 hours  sodium chloride 0.9% lock flush 3 milliLiter(s) IV Push every 8 hours    MEDICATIONS  (PRN):  ALBUTerol    0.083% 2.5 milliGRAM(s) Nebulizer every 6 hours PRN Bronchospasm  ibuprofen  Tablet 600 milliGRAM(s) Oral every 6 hours PRN Mild Pain  traMADol 50 milliGRAM(s) Oral every 4 hours PRN Moderate Pain (4 - 6)

## 2018-03-31 ENCOUNTER — TRANSCRIPTION ENCOUNTER (OUTPATIENT)
Age: 43
End: 2018-03-31

## 2018-03-31 VITALS
DIASTOLIC BLOOD PRESSURE: 65 MMHG | TEMPERATURE: 97 F | SYSTOLIC BLOOD PRESSURE: 98 MMHG | HEART RATE: 72 BPM | RESPIRATION RATE: 18 BRPM | OXYGEN SATURATION: 97 %

## 2018-03-31 PROCEDURE — 88305 TISSUE EXAM BY PATHOLOGIST: CPT

## 2018-03-31 PROCEDURE — 81003 URINALYSIS AUTO W/O SCOPE: CPT

## 2018-03-31 PROCEDURE — 82962 GLUCOSE BLOOD TEST: CPT

## 2018-03-31 PROCEDURE — 88307 TISSUE EXAM BY PATHOLOGIST: CPT

## 2018-03-31 PROCEDURE — 36415 COLL VENOUS BLD VENIPUNCTURE: CPT

## 2018-03-31 PROCEDURE — 85027 COMPLETE CBC AUTOMATED: CPT

## 2018-03-31 PROCEDURE — 94640 AIRWAY INHALATION TREATMENT: CPT

## 2018-03-31 RX ORDER — IBUPROFEN 200 MG
1 TABLET ORAL
Qty: 21 | Refills: 0
Start: 2018-03-31 | End: 2018-04-06

## 2018-03-31 RX ORDER — TRAMADOL HYDROCHLORIDE 50 MG/1
1 TABLET ORAL
Qty: 18 | Refills: 0
Start: 2018-03-31 | End: 2018-04-02

## 2018-03-31 RX ADMIN — SODIUM CHLORIDE 3 MILLILITER(S): 9 INJECTION INTRAMUSCULAR; INTRAVENOUS; SUBCUTANEOUS at 13:42

## 2018-03-31 RX ADMIN — TRAMADOL HYDROCHLORIDE 50 MILLIGRAM(S): 50 TABLET ORAL at 02:03

## 2018-03-31 RX ADMIN — Medication 600 MILLIGRAM(S): at 12:20

## 2018-03-31 RX ADMIN — Medication 600 MILLIGRAM(S): at 04:50

## 2018-03-31 RX ADMIN — Medication 3 MILLILITER(S): at 08:08

## 2018-03-31 RX ADMIN — TRAMADOL HYDROCHLORIDE 50 MILLIGRAM(S): 50 TABLET ORAL at 02:50

## 2018-03-31 RX ADMIN — Medication 600 MILLIGRAM(S): at 05:30

## 2018-03-31 NOTE — DISCHARGE NOTE ADULT - CARE PROVIDER_API CALL
Darcy Georges (MD), Obstetrics and Gynecology  [address] [place] [address]  Phone: [phone]  Fax: [phone]

## 2018-03-31 NOTE — DISCHARGE NOTE ADULT - HOSPITAL COURSE
pt presented for an abdominal hysterectomy which was performed without complications.  she had a normal post op course

## 2018-03-31 NOTE — DISCHARGE NOTE ADULT - NS MD DC PLAN IMMU FLU PROVIDE INFO
Vaccine Information Sheet (VIS) provided-VIS date: 8/07/15/Risks/benefits discussed with patient or patient surrogate
Eyeglasses

## 2018-03-31 NOTE — DISCHARGE NOTE ADULT - CARE PLAN
Principal Discharge DX:	Iron deficiency anemia due to chronic blood loss  Goal:	pain free  Assessment and plan of treatment:	Please follow up in our office in one week.  Please call sooner if there are any additional concerns.

## 2018-03-31 NOTE — PROGRESS NOTE ADULT - ASSESSMENT
Abdominal hysterectomy, Day: 4    She feels well  Continue the current pain medication  Encourage  Ambulation  Encourage regular diet   DVT ppx: SCDs when not ambulating  She is stable, tolerates a diet and has normal flatus and bowel movements  She will be discharged today   according to the normal criteria.

## 2018-03-31 NOTE — DISCHARGE NOTE ADULT - PATIENT PORTAL LINK FT
You can access the [facility], offered by [facility], by registering with the following website: [email or web address]

## 2018-03-31 NOTE — DISCHARGE NOTE ADULT - MEDICATION SUMMARY - MEDICATIONS TO TAKE
I will START or STAY ON the medications listed below when I get home from the hospital:    traMADol 50 mg oral tablet  -- 1 tab(s) by mouth every 4 hours, As needed, Moderate Pain (4 - 6) MDD:3  -- Indication: For Pain    ibuprofen 600 mg oral tablet  -- 1 tab(s) by mouth every 8 hours, As Needed -Mild Pain   -- Indication: For Pain    Iron 100 Plus oral tablet  --  by mouth 2 times a day  -- Indication: For nutritional    omeprazole 40 mg oral delayed release capsule  -- 1 cap(s) by mouth once a day  -- Indication: For Prior med

## 2018-03-31 NOTE — DISCHARGE NOTE ADULT - PLAN OF CARE
Please follow up in our office in one week.  Please call sooner if there are any additional concerns. pain free

## 2018-03-31 NOTE — PROGRESS NOTE ADULT - SUBJECTIVE AND OBJECTIVE BOX
This is a 43y woman who is s/p Abdominal Hysterectomy    She is now postoperative day: 4    Subjective:  The patient feels well.  She is ambulating and tolerating a diet  She is having bowel movements and flatus  She reports no breathing problems  She reports no headache or visual changes    Physical exam:  She generally looks and feels well    Vital Signs Last 24 Hrs  T(C): 36.7 (31 Mar 2018 00:23), Max: 36.9 (30 Mar 2018 10:00)  T(F): 98 (31 Mar 2018 00:23), Max: 98.4 (30 Mar 2018 10:00)  HR: 87 (31 Mar 2018 00:23) (76 - 103)  BP: 134/77 (31 Mar 2018 00:23) (94/64 - 134/77)  BP(mean): --  RR: 14 (31 Mar 2018 00:23) (14 - 18)  SpO2: 98% (31 Mar 2018 00:23) (95% - 98%)    Lungs: Normal  Heart: Regular rate and rhythm  Abdomen: Soft, nontender, no distension , the incision is clean dry and intact  Pelvic: Normal vaginal spotting noted  Ext: No DVT signs, warm extremities, normal pulses    LABS:            Urinalysis Basic - ( 30 Mar 2018 21:51 )    Color: Yellow / Appearance: Clear / S.010 / pH: x  Gluc: x / Ketone: Negative  / Bili: Negative / Urobili: Negative mg/dL   Blood: x / Protein: Negative mg/dL / Nitrite: Negative   Leuk Esterase: Negative / RBC: x / WBC x   Sq Epi: x / Non Sq Epi: x / Bacteria: x        Allergies    No Known Allergies    Intolerances    Percocet 5/325 (Vomiting; Nausea)    MEDICATIONS  (STANDING):  ALBUTerol/ipratropium for Nebulization 3 milliLiter(s) Nebulizer every 12 hours  ceFAZolin   IVPB 2000 milliGRAM(s) IV Intermittent once  sodium chloride 0.9% lock flush 3 milliLiter(s) IV Push every 8 hours    MEDICATIONS  (PRN):  ALBUTerol    0.083% 2.5 milliGRAM(s) Nebulizer every 6 hours PRN Bronchospasm  ibuprofen  Tablet 600 milliGRAM(s) Oral every 6 hours PRN Mild Pain  traMADol 50 milliGRAM(s) Oral every 4 hours PRN Moderate Pain (4 - 6)      RADIOLOGY & ADDITIONAL TESTS:

## 2018-04-02 LAB — SURGICAL PATHOLOGY FINAL REPORT - CH: SIGNIFICANT CHANGE UP

## 2018-08-07 ENCOUNTER — APPOINTMENT (OUTPATIENT)
Dept: VASCULAR SURGERY | Facility: CLINIC | Age: 43
End: 2018-08-07
Payer: MEDICAID

## 2018-08-07 ENCOUNTER — APPOINTMENT (OUTPATIENT)
Dept: VASCULAR SURGERY | Facility: CLINIC | Age: 43
End: 2018-08-07

## 2018-08-07 VITALS
DIASTOLIC BLOOD PRESSURE: 87 MMHG | WEIGHT: 223 LBS | HEART RATE: 72 BPM | BODY MASS INDEX: 39.5 KG/M2 | OXYGEN SATURATION: 96 % | SYSTOLIC BLOOD PRESSURE: 123 MMHG | TEMPERATURE: 97 F

## 2018-08-07 PROCEDURE — 93970 EXTREMITY STUDY: CPT

## 2018-08-07 PROCEDURE — 99214 OFFICE O/P EST MOD 30 MIN: CPT

## 2018-10-03 ENCOUNTER — APPOINTMENT (OUTPATIENT)
Dept: VASCULAR SURGERY | Facility: CLINIC | Age: 43
End: 2018-10-03
Payer: MEDICAID

## 2018-10-03 VITALS
HEIGHT: 63 IN | TEMPERATURE: 98.6 F | SYSTOLIC BLOOD PRESSURE: 114 MMHG | DIASTOLIC BLOOD PRESSURE: 87 MMHG | BODY MASS INDEX: 39.51 KG/M2 | WEIGHT: 223 LBS | OXYGEN SATURATION: 99 % | HEART RATE: 95 BPM

## 2018-10-03 DIAGNOSIS — Z86.2 PERSONAL HISTORY OF DISEASES OF THE BLOOD AND BLOOD-FORMING ORGANS AND CERTAIN DISORDERS INVOLVING THE IMMUNE MECHANISM: ICD-10-CM

## 2018-10-03 DIAGNOSIS — Z86.79 PERSONAL HISTORY OF OTHER DISEASES OF THE CIRCULATORY SYSTEM: ICD-10-CM

## 2018-10-03 PROCEDURE — 36471 NJX SCLRSNT MLT INCMPTNT VN: CPT

## 2018-10-05 PROBLEM — Z86.2 HISTORY OF ANEMIA: Status: RESOLVED | Noted: 2017-09-15 | Resolved: 2018-10-05

## 2018-10-05 PROBLEM — Z86.79 HISTORY OF VARICOSE VEINS: Status: RESOLVED | Noted: 2017-09-15 | Resolved: 2018-10-05

## 2018-10-24 ENCOUNTER — APPOINTMENT (OUTPATIENT)
Dept: VASCULAR SURGERY | Facility: CLINIC | Age: 43
End: 2018-10-24
Payer: MEDICAID

## 2018-10-24 VITALS
HEART RATE: 79 BPM | SYSTOLIC BLOOD PRESSURE: 118 MMHG | HEIGHT: 63 IN | RESPIRATION RATE: 14 BRPM | TEMPERATURE: 98.1 F | WEIGHT: 228 LBS | DIASTOLIC BLOOD PRESSURE: 82 MMHG | OXYGEN SATURATION: 100 % | BODY MASS INDEX: 40.4 KG/M2

## 2018-10-24 PROCEDURE — 36471 NJX SCLRSNT MLT INCMPTNT VN: CPT | Mod: RT

## 2018-10-30 ENCOUNTER — APPOINTMENT (OUTPATIENT)
Dept: VASCULAR SURGERY | Facility: CLINIC | Age: 43
End: 2018-10-30
Payer: MEDICAID

## 2018-10-30 VITALS
BODY MASS INDEX: 39.87 KG/M2 | WEIGHT: 225 LBS | DIASTOLIC BLOOD PRESSURE: 82 MMHG | HEART RATE: 82 BPM | HEIGHT: 63 IN | RESPIRATION RATE: 16 BRPM | OXYGEN SATURATION: 100 % | TEMPERATURE: 97.7 F | SYSTOLIC BLOOD PRESSURE: 122 MMHG

## 2018-10-30 PROCEDURE — 99213 OFFICE O/P EST LOW 20 MIN: CPT

## 2019-01-23 ENCOUNTER — APPOINTMENT (OUTPATIENT)
Dept: VASCULAR SURGERY | Facility: CLINIC | Age: 44
End: 2019-01-23
Payer: MEDICAID

## 2019-01-23 VITALS
DIASTOLIC BLOOD PRESSURE: 76 MMHG | OXYGEN SATURATION: 100 % | RESPIRATION RATE: 16 BRPM | HEART RATE: 89 BPM | SYSTOLIC BLOOD PRESSURE: 122 MMHG | BODY MASS INDEX: 40.04 KG/M2 | HEIGHT: 63 IN | TEMPERATURE: 100 F | WEIGHT: 226 LBS

## 2019-01-23 PROCEDURE — 36471 NJX SCLRSNT MLT INCMPTNT VN: CPT | Mod: LT

## 2019-01-23 RX ORDER — ALBUTEROL SULFATE 2.5 MG/3ML
(2.5 MG/3ML) SOLUTION RESPIRATORY (INHALATION)
Qty: 225 | Refills: 0 | Status: ACTIVE | COMMUNITY
Start: 2018-12-19

## 2019-01-30 ENCOUNTER — APPOINTMENT (OUTPATIENT)
Age: 44
End: 2019-01-30
Payer: MEDICAID

## 2019-01-30 VITALS
OXYGEN SATURATION: 99 % | WEIGHT: 224 LBS | BODY MASS INDEX: 39.69 KG/M2 | SYSTOLIC BLOOD PRESSURE: 112 MMHG | DIASTOLIC BLOOD PRESSURE: 78 MMHG | HEIGHT: 63 IN | HEART RATE: 97 BPM | TEMPERATURE: 98.7 F

## 2019-01-30 PROCEDURE — 36471 NJX SCLRSNT MLT INCMPTNT VN: CPT

## 2019-02-12 ENCOUNTER — APPOINTMENT (OUTPATIENT)
Dept: VASCULAR SURGERY | Facility: CLINIC | Age: 44
End: 2019-02-12
Payer: MEDICAID

## 2019-02-12 VITALS
HEART RATE: 83 BPM | OXYGEN SATURATION: 100 % | BODY MASS INDEX: 39.34 KG/M2 | WEIGHT: 222 LBS | TEMPERATURE: 97.8 F | DIASTOLIC BLOOD PRESSURE: 81 MMHG | SYSTOLIC BLOOD PRESSURE: 128 MMHG | HEIGHT: 63 IN

## 2019-02-12 PROCEDURE — 36471 NJX SCLRSNT MLT INCMPTNT VN: CPT

## 2019-03-12 ENCOUNTER — APPOINTMENT (OUTPATIENT)
Dept: VASCULAR SURGERY | Facility: CLINIC | Age: 44
End: 2019-03-12
Payer: MEDICAID

## 2019-03-12 VITALS
HEART RATE: 84 BPM | BODY MASS INDEX: 39.69 KG/M2 | OXYGEN SATURATION: 98 % | TEMPERATURE: 97.7 F | SYSTOLIC BLOOD PRESSURE: 121 MMHG | WEIGHT: 224 LBS | HEIGHT: 63 IN | DIASTOLIC BLOOD PRESSURE: 78 MMHG

## 2019-03-12 PROCEDURE — 36471 NJX SCLRSNT MLT INCMPTNT VN: CPT

## 2019-03-17 NOTE — PROCEDURE
[FreeTextEntry1] : sclerotherapy LLE [FreeTextEntry2] : venous insufficiency with pain [FreeTextEntry3] : Under aseptic technique, after informed consent obtained, the varicose incompetent veins of the left lower extremity were injected with 2 mL of 1% polidocanol. Pressure dressing applied. Pt tolerated well.\par \par

## 2019-04-01 ENCOUNTER — OUTPATIENT (OUTPATIENT)
Dept: OUTPATIENT SERVICES | Facility: HOSPITAL | Age: 44
LOS: 1 days | End: 2019-04-01
Payer: MEDICAID

## 2019-04-01 DIAGNOSIS — Z98.89 OTHER SPECIFIED POSTPROCEDURAL STATES: Chronic | ICD-10-CM

## 2019-04-01 DIAGNOSIS — Z90.49 ACQUIRED ABSENCE OF OTHER SPECIFIED PARTS OF DIGESTIVE TRACT: Chronic | ICD-10-CM

## 2019-04-01 DIAGNOSIS — Z71.89 OTHER SPECIFIED COUNSELING: ICD-10-CM

## 2019-04-01 PROCEDURE — G9001: CPT

## 2019-04-09 ENCOUNTER — APPOINTMENT (OUTPATIENT)
Dept: VASCULAR SURGERY | Facility: CLINIC | Age: 44
End: 2019-04-09
Payer: MEDICAID

## 2019-04-09 VITALS
TEMPERATURE: 97.6 F | SYSTOLIC BLOOD PRESSURE: 121 MMHG | HEART RATE: 87 BPM | RESPIRATION RATE: 16 BRPM | OXYGEN SATURATION: 98 % | WEIGHT: 226 LBS | DIASTOLIC BLOOD PRESSURE: 81 MMHG | HEIGHT: 63 IN | BODY MASS INDEX: 40.04 KG/M2

## 2019-04-09 PROCEDURE — 36471 NJX SCLRSNT MLT INCMPTNT VN: CPT

## 2019-04-18 NOTE — PROCEDURE
[FreeTextEntry1] : sclerotherapy LLE [FreeTextEntry2] : venous insufficiency with pain [FreeTextEntry3] : Under aseptic technique, after informed consent obtained, the varicose incompetent veins of the leftt lower extremity were injected with 2 mL of 1% polidocanol. Pressure dressing applied. Pt tolerated well.\par \par

## 2019-04-23 ENCOUNTER — APPOINTMENT (OUTPATIENT)
Dept: VASCULAR SURGERY | Facility: CLINIC | Age: 44
End: 2019-04-23
Payer: MEDICAID

## 2019-04-23 VITALS
SYSTOLIC BLOOD PRESSURE: 107 MMHG | HEART RATE: 94 BPM | BODY MASS INDEX: 40.04 KG/M2 | DIASTOLIC BLOOD PRESSURE: 64 MMHG | OXYGEN SATURATION: 97 % | WEIGHT: 226 LBS | TEMPERATURE: 97.7 F | HEIGHT: 63 IN

## 2019-04-23 PROCEDURE — 36471 NJX SCLRSNT MLT INCMPTNT VN: CPT

## 2019-05-24 NOTE — PROCEDURE
[FreeTextEntry1] : sclerotherapy LLE [FreeTextEntry2] : venous insufficiency with pain [FreeTextEntry3] : Under aseptic technique, after informed consent obtained, the varicose incompetent veins of the right lower extremity were injected with 2 mL of 1% polidocanol, using 30 gauge needle. Pressure dressing applied. Pt tolerated well.\par \par

## 2019-06-04 ENCOUNTER — APPOINTMENT (OUTPATIENT)
Dept: VASCULAR SURGERY | Facility: CLINIC | Age: 44
End: 2019-06-04

## 2019-07-10 NOTE — PATIENT PROFILE ADULT. - NSCAFFEINETYPE_GEN_ALL_CORE_SD
Problem: Patient/Family Goals  Goal: Patient/Family Long Term Goal  Description  Patient's Long Term Goal: To be discharged home    Interventions:  -Assess VS as ordered  -continuous pulse oximetry  -administer oxygen as needed to maintain saturations wi hospitalization  Description  INTERVENTIONS:  - Assess and monitor for signs and symptoms of infection  - Monitor lab/diagnostic results  - Monitor all insertion sites i.e., indwelling lines, tubes and drains  - Monitor endotracheal (as able) and nasal sec tea/coffee

## 2019-07-16 NOTE — PROCEDURE
[FreeTextEntry1] : sclerotherapy RLE [FreeTextEntry2] : venous insufficiency with pain [FreeTextEntry3] : Under aseptic technique, after informed consent obtained, the varicose incompetent veins of the right lower extremity were injected with 2 mL of 1% polidocanol. Pressure dressing applied. Pt tolerated well.\par \par

## 2019-07-17 ENCOUNTER — APPOINTMENT (OUTPATIENT)
Dept: VASCULAR SURGERY | Facility: CLINIC | Age: 44
End: 2019-07-17
Payer: MEDICAID

## 2019-07-17 VITALS
HEART RATE: 83 BPM | RESPIRATION RATE: 16 BRPM | BODY MASS INDEX: 40.4 KG/M2 | HEIGHT: 63 IN | TEMPERATURE: 98.1 F | WEIGHT: 228 LBS | SYSTOLIC BLOOD PRESSURE: 114 MMHG | OXYGEN SATURATION: 99 % | DIASTOLIC BLOOD PRESSURE: 78 MMHG

## 2019-07-17 PROCEDURE — 99214 OFFICE O/P EST MOD 30 MIN: CPT

## 2019-07-17 PROCEDURE — 93970 EXTREMITY STUDY: CPT

## 2019-07-17 RX ORDER — CYCLOBENZAPRINE HYDROCHLORIDE 10 MG/1
10 TABLET, FILM COATED ORAL
Refills: 0 | Status: COMPLETED | COMMUNITY
End: 2019-07-17

## 2019-07-17 RX ORDER — AMOXICILLIN AND CLAVULANATE POTASSIUM 875; 125 MG/1; MG/1
875-125 TABLET, COATED ORAL
Qty: 20 | Refills: 0 | Status: COMPLETED | COMMUNITY
Start: 2019-01-09 | End: 2019-07-17

## 2019-07-17 RX ORDER — BACLOFEN 10 MG/1
10 TABLET ORAL 3 TIMES DAILY
Qty: 30 | Refills: 0 | Status: COMPLETED | COMMUNITY
Start: 2019-02-12 | End: 2019-07-17

## 2019-07-17 RX ORDER — NABUMETONE 500 MG/1
500 TABLET, FILM COATED ORAL
Refills: 0 | Status: COMPLETED | COMMUNITY
End: 2019-07-17

## 2019-07-17 RX ORDER — DICLOFENAC SODIUM 75 MG/1
75 TABLET, DELAYED RELEASE ORAL
Qty: 1 | Refills: 2 | Status: COMPLETED | COMMUNITY
Start: 2019-04-09 | End: 2019-07-17

## 2019-07-17 RX ORDER — DICLOFENAC SODIUM 75 MG/1
75 TABLET, DELAYED RELEASE ORAL
Qty: 3 | Refills: 2 | Status: COMPLETED | COMMUNITY
Start: 2019-02-12 | End: 2019-07-17

## 2019-07-17 RX ORDER — TRAMADOL HYDROCHLORIDE 50 MG/1
50 TABLET, COATED ORAL
Refills: 0 | Status: COMPLETED | COMMUNITY
End: 2019-07-17

## 2019-09-10 ENCOUNTER — APPOINTMENT (OUTPATIENT)
Dept: VASCULAR SURGERY | Facility: CLINIC | Age: 44
End: 2019-09-10
Payer: MEDICAID

## 2019-09-10 VITALS
HEART RATE: 60 BPM | HEIGHT: 63 IN | WEIGHT: 228 LBS | SYSTOLIC BLOOD PRESSURE: 133 MMHG | OXYGEN SATURATION: 96 % | DIASTOLIC BLOOD PRESSURE: 79 MMHG | BODY MASS INDEX: 40.4 KG/M2 | TEMPERATURE: 97.9 F

## 2019-09-10 DIAGNOSIS — M79.2 NEURALGIA AND NEURITIS, UNSPECIFIED: ICD-10-CM

## 2019-09-10 PROCEDURE — 99213 OFFICE O/P EST LOW 20 MIN: CPT

## 2019-09-24 ENCOUNTER — APPOINTMENT (OUTPATIENT)
Dept: VASCULAR SURGERY | Facility: CLINIC | Age: 44
End: 2019-09-24
Payer: MEDICAID

## 2019-09-24 VITALS
WEIGHT: 227 LBS | OXYGEN SATURATION: 97 % | RESPIRATION RATE: 16 BRPM | DIASTOLIC BLOOD PRESSURE: 76 MMHG | SYSTOLIC BLOOD PRESSURE: 104 MMHG | TEMPERATURE: 97.8 F | HEIGHT: 63 IN | BODY MASS INDEX: 40.22 KG/M2 | HEART RATE: 84 BPM

## 2019-09-24 PROCEDURE — 99213 OFFICE O/P EST LOW 20 MIN: CPT

## 2019-09-24 RX ORDER — GABAPENTIN 300 MG/1
300 CAPSULE ORAL
Qty: 30 | Refills: 3 | Status: COMPLETED | COMMUNITY
Start: 2017-09-15 | End: 2019-09-24

## 2019-10-23 ENCOUNTER — APPOINTMENT (OUTPATIENT)
Dept: VASCULAR SURGERY | Facility: CLINIC | Age: 44
End: 2019-10-23
Payer: MEDICAID

## 2019-10-23 VITALS
SYSTOLIC BLOOD PRESSURE: 114 MMHG | HEIGHT: 63 IN | BODY MASS INDEX: 40.22 KG/M2 | DIASTOLIC BLOOD PRESSURE: 74 MMHG | WEIGHT: 227 LBS | HEART RATE: 91 BPM | TEMPERATURE: 97.5 F | OXYGEN SATURATION: 97 %

## 2019-10-23 PROCEDURE — 36471 NJX SCLRSNT MLT INCMPTNT VN: CPT | Mod: 50

## 2019-10-25 NOTE — PROCEDURE
[FreeTextEntry1] : BLE Sclerotherapy [FreeTextEntry2] : I83.11 [FreeTextEntry3] : After informed consent obtained, under aseptic technique 2 mL of 1% polidocanol sclerosing solution were used to perform sclerotherapy of varicose veins of both lower extremities with a 30 g needle. A sterile pressure dressing was applied. Pain was well controlled and the patient tolerated the procedure well.\par \par BILATERAL LE SCLERO  Auth EXPIRES 12/31  VISIT 1/6\par

## 2019-11-26 ENCOUNTER — APPOINTMENT (OUTPATIENT)
Dept: VASCULAR SURGERY | Facility: CLINIC | Age: 44
End: 2019-11-26
Payer: MEDICAID

## 2019-11-26 ENCOUNTER — APPOINTMENT (OUTPATIENT)
Dept: VASCULAR SURGERY | Facility: CLINIC | Age: 44
End: 2019-11-26

## 2019-11-26 VITALS
OXYGEN SATURATION: 100 % | TEMPERATURE: 97.8 F | SYSTOLIC BLOOD PRESSURE: 117 MMHG | DIASTOLIC BLOOD PRESSURE: 88 MMHG | BODY MASS INDEX: 41.11 KG/M2 | HEART RATE: 91 BPM | RESPIRATION RATE: 16 BRPM | WEIGHT: 232 LBS | HEIGHT: 63 IN

## 2019-11-26 DIAGNOSIS — Z98.890 OTHER SPECIFIED POSTPROCEDURAL STATES: ICD-10-CM

## 2019-11-26 DIAGNOSIS — R60.0 LOCALIZED EDEMA: ICD-10-CM

## 2019-11-26 DIAGNOSIS — Z86.79 OTHER SPECIFIED POSTPROCEDURAL STATES: ICD-10-CM

## 2019-11-26 PROCEDURE — 99213 OFFICE O/P EST LOW 20 MIN: CPT

## 2020-11-18 ENCOUNTER — APPOINTMENT (OUTPATIENT)
Dept: RHEUMATOLOGY | Facility: CLINIC | Age: 45
End: 2020-11-18
Payer: MEDICAID

## 2020-11-18 ENCOUNTER — LABORATORY RESULT (OUTPATIENT)
Age: 45
End: 2020-11-18

## 2020-11-18 VITALS
RESPIRATION RATE: 17 BRPM | TEMPERATURE: 98.5 F | DIASTOLIC BLOOD PRESSURE: 80 MMHG | WEIGHT: 225 LBS | HEART RATE: 75 BPM | OXYGEN SATURATION: 99 % | BODY MASS INDEX: 37.49 KG/M2 | HEIGHT: 65 IN | SYSTOLIC BLOOD PRESSURE: 112 MMHG

## 2020-11-18 DIAGNOSIS — M54.5 LOW BACK PAIN: ICD-10-CM

## 2020-11-18 PROCEDURE — 99203 OFFICE O/P NEW LOW 30 MIN: CPT

## 2020-11-18 NOTE — PHYSICAL EXAM
[General Appearance - Alert] : alert [General Appearance - In No Acute Distress] : in no acute distress [General Appearance - Well Nourished] : well nourished [General Appearance - Well Developed] : well developed [Sclera] : the sclera and conjunctiva were normal [PERRL With Normal Accommodation] : pupils were equal in size, round, and reactive to light [Extraocular Movements] : extraocular movements were intact [Outer Ear] : the ears and nose were normal in appearance [Examination Of The Oral Cavity] : the lips and gums were normal [Oropharynx] : the oropharynx was normal [Neck Appearance] : the appearance of the neck was normal [Jugular Venous Distention Increased] : there was no jugular-venous distention [Auscultation Breath Sounds / Voice Sounds] : lungs were clear to auscultation bilaterally [Heart Rate And Rhythm] : heart rate was normal and rhythm regular [Heart Sounds] : normal S1 and S2 [Heart Sounds Gallop] : no gallops [Murmurs] : no murmurs [Heart Sounds Pericardial Friction Rub] : no pericardial rub [Bowel Sounds] : normal bowel sounds [Abdomen Soft] : soft [Abdomen Tenderness] : non-tender [No CVA Tenderness] : no ~M costovertebral angle tenderness [No Spinal Tenderness] : no spinal tenderness [Abnormal Walk] : normal gait [Nail Clubbing] : no clubbing  or cyanosis of the fingernails [Musculoskeletal - Swelling] : no joint swelling seen [Motor Tone] : muscle strength and tone were normal [] : no rash [Skin Lesions] : no skin lesions [Sensation] : the sensory exam was normal to light touch and pinprick [Motor Exam] : the motor exam was normal [No Focal Deficits] : no focal deficits [Oriented To Time, Place, And Person] : oriented to person, place, and time [Impaired Insight] : insight and judgment were intact [Affect] : the affect was normal [Mood] : the mood was normal [FreeTextEntry1] : +BL knee crepitus

## 2020-11-18 NOTE — HISTORY OF PRESENT ILLNESS
[FreeTextEntry1] : 45 year old female with PMH as listed below presents today for an initial evaluation\par \par Reports to have 3 month hx of diffuse polyarthralgias, myalgias- worse to low back, BL knees, BL arms and legs. Has radicular symptoms. No bowel/ bladder incontinence. Symptoms worse with activity and at the end of the day. Has stiffness that starts in AM and lasts the whole day. Currently taking NSAIDS prn with some improvement in symptoms. Denies swelling to the joints. \par \par +fatigue, +poor sleep\par \par denies fever, chills, weight loss, weight gain, denies dry eye,eye pain, eye redness, vision changes, oral ulcers, nasal congestion, difficulty swallowing,  denies ear pain, hearing changes, denies sob, denies nausea, vomiting, abdominal pain, diarrhea, constipation, blood in stool, denies dysuria, blood in the urine, denies rashes, photosensitivity, hair loss, skin thickening, denies blood clots,  raynauds

## 2020-11-18 NOTE — ASSESSMENT
[FreeTextEntry1] : 45 year old female presents today for an initial evaluation. Reports to have 3 month hx of diffuse polyarthralgias, myalgias- worse to low back, BL knees,fatigue, poor sleep. Currently taking NSAIDS prn with some improvement in symptoms.\par \par Will do further w/o as below. May have a component of fibromyalgia\par \par -labs as below\par -imaging as below\par -NSAIDS/Tylenol prn for pain (reviewed risk and benefits of medications)\par -OTC topical analgesics\par -encouraged proper diet, good sleep hygiene, exercise \par -start cyclobenzaprine 5mg daily prn at bedtime\par \par Discussed treatment plan with the patient. The patient was given the opportunity to ask questions and all questions were answered to their satisfaction.

## 2020-12-02 ENCOUNTER — APPOINTMENT (OUTPATIENT)
Dept: RHEUMATOLOGY | Facility: CLINIC | Age: 45
End: 2020-12-02
Payer: MEDICAID

## 2020-12-02 VITALS
WEIGHT: 225 LBS | HEART RATE: 86 BPM | TEMPERATURE: 98.2 F | DIASTOLIC BLOOD PRESSURE: 72 MMHG | SYSTOLIC BLOOD PRESSURE: 122 MMHG | BODY MASS INDEX: 37.49 KG/M2 | RESPIRATION RATE: 17 BRPM | OXYGEN SATURATION: 98 % | HEIGHT: 65 IN

## 2020-12-02 VITALS — BODY MASS INDEX: 37.28 KG/M2 | WEIGHT: 224 LBS

## 2020-12-02 LAB
25(OH)D3 SERPL-MCNC: 38.1 NG/ML
ALBUMIN MFR SERPL ELPH: 54.3 %
ALBUMIN SERPL ELPH-MCNC: 4.4 G/DL
ALBUMIN SERPL-MCNC: 3.8 G/DL
ALBUMIN/GLOB SERPL: 1.2 RATIO
ALBUPE: 15 %
ALP BLD-CCNC: 121 U/L
ALPHA1 GLOB MFR SERPL ELPH: 4.4 %
ALPHA1 GLOB SERPL ELPH-MCNC: 0.3 G/DL
ALPHA1UPE: 36.2 %
ALPHA2 GLOB MFR SERPL ELPH: 13.2 %
ALPHA2 GLOB SERPL ELPH-MCNC: 0.9 G/DL
ALPHA2UPE: 21.9 %
ALT SERPL-CCNC: 10 U/L
ANA PAT FLD IF-IMP: ABNORMAL
ANA SER IF-ACNC: ABNORMAL
ANION GAP SERPL CALC-SCNC: 12 MMOL/L
APPEARANCE: CLEAR
AST SERPL-CCNC: 10 U/L
B-GLOBULIN MFR SERPL ELPH: 11.5 %
B-GLOBULIN SERPL ELPH-MCNC: 0.8 G/DL
BASOPHILS # BLD AUTO: 0.03 K/UL
BASOPHILS NFR BLD AUTO: 0.3 %
BETAUPE: 17 %
BILIRUB SERPL-MCNC: <0.2 MG/DL
BILIRUBIN URINE: NEGATIVE
BLOOD URINE: NEGATIVE
BUN SERPL-MCNC: 16 MG/DL
C3 SERPL-MCNC: 156 MG/DL
C4 SERPL-MCNC: 30 MG/DL
CALCIUM SERPL-MCNC: 9.9 MG/DL
CCP AB SER IA-ACNC: <8 UNITS
CENTROMERE IGG SER-ACNC: <0.2 CD:130001892
CHLORIDE SERPL-SCNC: 99 MMOL/L
CHROMATIN AB SERPL-ACNC: <0.2 AL
CK SERPL-CCNC: 45 U/L
CO2 SERPL-SCNC: 27 MMOL/L
COLOR: YELLOW
CREAT 24H UR-MCNC: NORMAL G/24 H
CREAT SERPL-MCNC: 0.57 MG/DL
CREAT SPEC-SCNC: 162 MG/DL
CREAT/PROT UR: 0.1 RATIO
CREATININE UR (MAYO): 155 MG/DL
CRP SERPL-MCNC: 3 MG/DL
DSDNA AB SER-ACNC: <12 IU/ML
ENA RNP AB SER IA-ACNC: 0.7 AL
ENA SS-A AB SER IA-ACNC: <0.2 AL
ENA SS-B AB SER IA-ACNC: <0.2 AL
EOSINOPHIL # BLD AUTO: 0.25 K/UL
EOSINOPHIL NFR BLD AUTO: 2.4 %
ERYTHROCYTE [SEDIMENTATION RATE] IN BLOOD BY WESTERGREN METHOD: 21 MM/HR
GAMMA GLOB FLD ELPH-MCNC: 1.2 G/DL
GAMMA GLOB MFR SERPL ELPH: 16.6 %
GAMMAUPE: 9.9 %
GLUCOSE QUALITATIVE U: ABNORMAL
GLUCOSE SERPL-MCNC: 297 MG/DL
HCT VFR BLD CALC: 40.2 %
HGB BLD-MCNC: 12 G/DL
IGA 24H UR QL IFE: NORMAL
IMM GRANULOCYTES NFR BLD AUTO: 0.5 %
INTERPRETATION SERPL IEP-IMP: NORMAL
KAPPA LC 24H UR QL: NORMAL
KETONES URINE: NEGATIVE
LEUKOCYTE ESTERASE URINE: NEGATIVE
LYMPHOCYTES # BLD AUTO: 2.22 K/UL
LYMPHOCYTES NFR BLD AUTO: 21.3 %
M PROTEIN SPEC IFE-MCNC: NORMAL
MAN DIFF?: NORMAL
MCHC RBC-ENTMCNC: 24 PG
MCHC RBC-ENTMCNC: 29.9 GM/DL
MCV RBC AUTO: 80.4 FL
MONOCYTES # BLD AUTO: 0.54 K/UL
MONOCYTES NFR BLD AUTO: 5.2 %
NEUTROPHILS # BLD AUTO: 7.31 K/UL
NEUTROPHILS NFR BLD AUTO: 70.3 %
NITRITE URINE: NEGATIVE
PH URINE: 6
PLATELET # BLD AUTO: 334 K/UL
POTASSIUM SERPL-SCNC: 4.5 MMOL/L
PROT PATTERN 24H UR ELPH-IMP: NORMAL
PROT SERPL-MCNC: 7 G/DL
PROT UR-MCNC: 13 MG/DL
PROT UR-MCNC: 13 MG/DL
PROT UR-MCNC: 16 MG/DL
PROTEIN URINE: NORMAL
RBC # BLD: 5 M/UL
RBC # FLD: 15.8 %
RF+CCP IGG SER-IMP: NEGATIVE
RHEUMATOID FACT SER QL: <10 IU/ML
SODIUM SERPL-SCNC: 138 MMOL/L
SPECIFIC GRAVITY URINE: 1.03
SPECIMEN VOL 24H UR: NORMAL ML
THYROGLOB AB SERPL-ACNC: <20 IU/ML
THYROPEROXIDASE AB SERPL IA-ACNC: <10 IU/ML
THYROPEROXIDASE AB SERPL IA-ACNC: <10 IU/ML
TSH SERPL-ACNC: 3.19 UIU/ML
UROBILINOGEN URINE: NORMAL
WBC # FLD AUTO: 10.4 K/UL

## 2020-12-02 PROCEDURE — 99214 OFFICE O/P EST MOD 30 MIN: CPT

## 2020-12-02 PROCEDURE — 99072 ADDL SUPL MATRL&STAF TM PHE: CPT

## 2020-12-02 RX ORDER — BACLOFEN 10 MG/1
10 TABLET ORAL 3 TIMES DAILY
Qty: 30 | Refills: 1 | Status: DISCONTINUED | COMMUNITY
Start: 2019-09-10 | End: 2020-12-02

## 2020-12-02 RX ORDER — BACLOFEN 10 MG/1
10 TABLET ORAL 3 TIMES DAILY
Qty: 30 | Refills: 0 | Status: DISCONTINUED | COMMUNITY
Start: 2019-11-26 | End: 2020-12-02

## 2020-12-02 RX ORDER — AZITHROMYCIN 250 MG/1
250 TABLET, FILM COATED ORAL
Qty: 6 | Refills: 0 | Status: DISCONTINUED | COMMUNITY
Start: 2019-11-26 | End: 2020-12-02

## 2020-12-02 RX ORDER — DICLOFENAC SODIUM 75 MG/1
75 TABLET, DELAYED RELEASE ORAL
Qty: 60 | Refills: 2 | Status: DISCONTINUED | COMMUNITY
Start: 2019-09-10 | End: 2020-12-02

## 2020-12-02 RX ORDER — OMEPRAZOLE 40 MG/1
40 CAPSULE, DELAYED RELEASE ORAL
Qty: 30 | Refills: 0 | Status: DISCONTINUED | COMMUNITY
Start: 2018-12-19 | End: 2020-12-02

## 2020-12-03 NOTE — ASSESSMENT
[FreeTextEntry1] : 45 year old female presents today for an f/u visit. Reports to have 3 month hx of diffuse polyarthralgias, myalgias- worse to low back, BL knees, polyarthritis to BL hands, fatigue, poor sleep. Labs with mild elevation in inflammatory markers, TERI 1:160\par \par - trail with low dose prednisone  (reviewed risk and benefits of medications)\par -Tylenol prn for pain\par -OTC topical analgesics\par -encouraged proper diet, good sleep hygiene, exercise \par \par Discussed treatment plan with the patient. The patient was given the opportunity to ask questions and all questions were answered to their satisfaction.

## 2020-12-03 NOTE — HISTORY OF PRESENT ILLNESS
[FreeTextEntry1] : Pt presenting today for a f.u visit. \par Labs reviewed with pt- has mild elevation in inflammatory markers, TERI 1:160. s/p trial with cyclobenzaprine. Stopped as she developed n/v. \par Continues to have diffuse polyarthralgias, myalgias, now reports intermittent polyarthritis to BL hands and wrists, fatigue, poor sleep.

## 2020-12-16 ENCOUNTER — APPOINTMENT (OUTPATIENT)
Dept: RHEUMATOLOGY | Facility: CLINIC | Age: 45
End: 2020-12-16
Payer: MEDICAID

## 2020-12-16 VITALS
HEIGHT: 65 IN | OXYGEN SATURATION: 99 % | SYSTOLIC BLOOD PRESSURE: 110 MMHG | TEMPERATURE: 97.2 F | DIASTOLIC BLOOD PRESSURE: 76 MMHG | WEIGHT: 224 LBS | RESPIRATION RATE: 17 BRPM | BODY MASS INDEX: 37.32 KG/M2 | HEART RATE: 101 BPM

## 2020-12-16 DIAGNOSIS — M25.50 PAIN IN UNSPECIFIED JOINT: ICD-10-CM

## 2020-12-16 DIAGNOSIS — R79.82 ELEVATED C-REACTIVE PROTEIN (CRP): ICD-10-CM

## 2020-12-16 DIAGNOSIS — R70.0 ELEVATED ERYTHROCYTE SEDIMENTATION RATE: ICD-10-CM

## 2020-12-16 DIAGNOSIS — M79.10 MYALGIA, UNSPECIFIED SITE: ICD-10-CM

## 2020-12-16 DIAGNOSIS — Z72.820 SLEEP DEPRIVATION: ICD-10-CM

## 2020-12-16 PROCEDURE — 99214 OFFICE O/P EST MOD 30 MIN: CPT

## 2020-12-16 PROCEDURE — 99072 ADDL SUPL MATRL&STAF TM PHE: CPT

## 2020-12-16 RX ORDER — CYCLOBENZAPRINE HYDROCHLORIDE 5 MG/1
5 TABLET, FILM COATED ORAL
Qty: 30 | Refills: 1 | Status: DISCONTINUED | COMMUNITY
Start: 2020-11-18 | End: 2020-12-16

## 2020-12-16 RX ORDER — PREDNISONE 10 MG/1
10 TABLET ORAL DAILY
Qty: 15 | Refills: 1 | Status: DISCONTINUED | COMMUNITY
Start: 2020-12-02 | End: 2020-12-16

## 2020-12-16 NOTE — ASSESSMENT
[FreeTextEntry1] : 45 year old female presents today for an f/u visit. Reports to have 3 month hx of diffuse polyarthralgias, myalgias- worse to low back, BL knees, polyarthritis to BL hands, fatigue, poor sleep. Labs with mild elevation in inflammatory markers, TERI 1:160. s/p trail with low dose prednisone with significant improvement in symptoms. \par \par - start HCQ 200mg BID  (reviewed risk and benefits of medications)\par - baseline opthalmology evaluation \par -Tylenol prn for pain\par -OTC topical analgesics\par -encouraged proper diet, good sleep hygiene, exercise \par \par Discussed treatment plan with the patient. The patient was given the opportunity to ask questions and all questions were answered to their satisfaction.

## 2020-12-16 NOTE — HISTORY OF PRESENT ILLNESS
[FreeTextEntry1] : Pt presenting today for a f.u visit. Now s/p trail with low dose prednisone with significant improvement in symptoms. Off prednisone now as it elevated blood sugar.

## 2021-02-01 ENCOUNTER — APPOINTMENT (OUTPATIENT)
Dept: RHEUMATOLOGY | Facility: CLINIC | Age: 46
End: 2021-02-01

## 2021-02-22 NOTE — PROGRESS NOTE ADULT - SUBJECTIVE AND OBJECTIVE BOX
HPI:  43 year old female present to [facility] for total abdominal hysterectomy. (22 Mar 2018 12:03)     Allergies    No Known Allergies    Intolerances    Percocet 5/325 (Vomiting; Nausea)    Anemia  Insomnia  Diabetes  Murmur  Migraines  Asthma    MEDICATIONS  (STANDING):  ALBUTerol/ipratropium for Nebulization 3 milliLiter(s) Nebulizer every 12 hours  ceFAZolin   IVPB 2000 milliGRAM(s) IV Intermittent once  lactated ringers. 1000 milliLiter(s) (125 mL/Hr) IV Continuous <Continuous>  sodium chloride 0.9% lock flush 3 milliLiter(s) IV Push every 8 hours    MEDICATIONS  (PRN):  ALBUTerol    0.083% 2.5 milliGRAM(s) Nebulizer every 6 hours PRN Bronchospasm  ibuprofen  Tablet 600 milliGRAM(s) Oral every 6 hours PRN Mild Pain  traMADol 50 milliGRAM(s) Oral every 4 hours PRN Moderate Pain (4 - 6)                           10.8   14.1  )-----------( 305      ( 28 Mar 2018 10:31 )             33.9             ;  Vital Signs Last 24 Hrs  T(C): 36.8 (29 Mar 2018 15:25), Max: 37.1 (28 Mar 2018 20:00)  T(F): 98.2 (29 Mar 2018 15:25), Max: 98.7 (28 Mar 2018 20:00)  HR: 91 (29 Mar 2018 15:25) (82 - 98)  BP: 114/81 (29 Mar 2018 15:25) (98/60 - 114/81)  BP(mean): --  RR: 18 (29 Mar 2018 15:25) (18 - 18)  SpO2: 93% (29 Mar 2018 15:25) (93% - 98%)  CAPILLARY BLOOD GLUCOSE      03-28 @ 07:01  -  03-29 @ 07:00  --------------------------------------------------------  IN: 0 mL / OUT: 1501 mL / NET: -1501 mL    03-29 @ 07:01  -  03-29 @ 19:36  --------------------------------------------------------  IN: 1250 mL / OUT: 900 mL / NET: 350 mL        Patient feeling better No CP, No SOB, No N/V mild Abd pain    HEENT: PEARLA  Neck: Supple  Cardio: S1 S2 1/6 SE Murmur  Pulm: CTA No Rales No Ronchi Good air entry  Abd: Soft NT ND BS+, midline incision clean  Rectal Pelvic Gyn- refused  Ext: No DCT  Skin: No Rash  Neuro: Awake Pleasant    Asthma - Duoneb with Albuterol Prn  Anemia - Follow Hb mild  Hysterectomy - Promoted ambulation Tremfya Pregnancy And Lactation Text: The risk during pregnancy and breastfeeding is uncertain with this medication.

## 2021-04-28 ENCOUNTER — NON-APPOINTMENT (OUTPATIENT)
Age: 46
End: 2021-04-28

## 2021-04-28 ENCOUNTER — APPOINTMENT (OUTPATIENT)
Dept: CARDIOLOGY | Facility: CLINIC | Age: 46
End: 2021-04-28
Payer: MEDICAID

## 2021-04-28 VITALS
HEART RATE: 78 BPM | DIASTOLIC BLOOD PRESSURE: 85 MMHG | BODY MASS INDEX: 35.82 KG/M2 | WEIGHT: 215 LBS | OXYGEN SATURATION: 98 % | TEMPERATURE: 98.2 F | HEIGHT: 65 IN | RESPIRATION RATE: 16 BRPM | SYSTOLIC BLOOD PRESSURE: 125 MMHG

## 2021-04-28 PROCEDURE — 93000 ELECTROCARDIOGRAM COMPLETE: CPT

## 2021-04-28 PROCEDURE — 99072 ADDL SUPL MATRL&STAF TM PHE: CPT

## 2021-04-28 PROCEDURE — 99205 OFFICE O/P NEW HI 60 MIN: CPT

## 2021-04-28 RX ORDER — MULTIVIT-MIN/IRON/FOLIC ACID/K 18-600-40
CAPSULE ORAL
Refills: 0 | Status: ACTIVE | COMMUNITY

## 2021-04-28 RX ORDER — PNV NO.95/FERROUS FUM/FOLIC AC 28MG-0.8MG
TABLET ORAL
Refills: 0 | Status: ACTIVE | COMMUNITY

## 2021-04-28 RX ORDER — MONTELUKAST SODIUM 10 MG/1
10 TABLET, FILM COATED ORAL
Refills: 0 | Status: ACTIVE | COMMUNITY

## 2021-04-28 RX ORDER — HYDROXYCHLOROQUINE SULFATE 200 MG/1
200 TABLET, FILM COATED ORAL TWICE DAILY
Qty: 60 | Refills: 3 | Status: DISCONTINUED | COMMUNITY
Start: 2020-12-16 | End: 2021-04-28

## 2021-04-28 NOTE — HISTORY OF PRESENT ILLNESS
[FreeTextEntry1] : PAtient is a 45yo F with DM, obesity, TOMMY here for eval of chest pain.No regular exercise. STarted having pain a bout 2 weeks ago. Feels like something sticking in chest, on left and middle of chest. Seems worse at night. Feels like something pushing on chest. Lasts for seconds and resolves. GEts pain in left arm and goes to fingers. GEts heart racing when goes up stairs and feels tired. Also short of breath. Patient denies PND/orthopnea/edema/syncope/claudication \par \par Lives with  and 3 kids. Not working\par \par ROS: GI negative, all others negative \par \par

## 2021-04-28 NOTE — PHYSICAL EXAM
[Normal] : clear lung fields, good air entry, no respiratory distress [Soft] : abdomen soft [Non Tender] : non-tender [Normal Gait] : normal gait [No Edema] : no edema [No Cyanosis] : no cyanosis [No Clubbing] : no clubbing [Moves all extremities] : moves all extremities [Alert and Oriented] : alert and oriented [Normal S1, S2] : normal S1, S2 [de-identified] : II/VI soft systolic murmur at the base

## 2021-04-28 NOTE — DISCUSSION/SUMMARY
[FreeTextEntry1] : PAtient is a 47yo F with DM, obesity, TOMMY here for eval of chest pain/SOB.\par -ECG and exam unremarkable\par -Lipids and BP controlled, A1C mildly elevated \par -Exam with soft cardiac murmur, otherwise unremkarable\par -CP atypical, dyspnea fairly pronounced. Could be asthma, weight, deconditioning, or cardiac. Needs cardiac evaluation. States does not think she can go on treadmill as gets quite SOB. \par \par 1. Echo and nuclear stress testing to evaluate SOB/CP in patient with DM/sedentary lifestyle \par 2..Recommend aggressive diet and lifestyle modifications \par 3. Diabetes management per PMD. Counselled on diet/weight loss \par 4. Follow up after testing

## 2021-06-14 ENCOUNTER — APPOINTMENT (OUTPATIENT)
Dept: CARDIOLOGY | Facility: CLINIC | Age: 46
End: 2021-06-14
Payer: MEDICAID

## 2021-06-14 PROCEDURE — 93306 TTE W/DOPPLER COMPLETE: CPT

## 2021-06-25 ENCOUNTER — APPOINTMENT (OUTPATIENT)
Dept: CARDIOLOGY | Facility: CLINIC | Age: 46
End: 2021-06-25
Payer: MEDICAID

## 2021-06-25 PROCEDURE — 78452 HT MUSCLE IMAGE SPECT MULT: CPT

## 2021-06-25 PROCEDURE — 93015 CV STRESS TEST SUPVJ I&R: CPT

## 2021-06-25 PROCEDURE — A9500: CPT

## 2021-06-25 RX ORDER — AMINOPHYLLINE 25 MG/ML
25 INJECTION, SOLUTION INTRAVENOUS
Qty: 0 | Refills: 0 | Status: COMPLETED | OUTPATIENT
Start: 2021-06-25

## 2021-06-25 RX ORDER — REGADENOSON 0.08 MG/ML
0.4 INJECTION, SOLUTION INTRAVENOUS
Qty: 4 | Refills: 0 | Status: COMPLETED | OUTPATIENT
Start: 2021-06-25

## 2021-06-25 RX ADMIN — REGADENOSON 0 MG/5ML: 0.08 INJECTION, SOLUTION INTRAVENOUS at 00:00

## 2021-06-25 RX ADMIN — AMINOPHYLLINE 0 MG/ML: 25 INJECTION, SOLUTION INTRAVENOUS at 00:00

## 2021-06-28 ENCOUNTER — APPOINTMENT (OUTPATIENT)
Dept: CARDIOLOGY | Facility: CLINIC | Age: 46
End: 2021-06-28

## 2021-07-01 ENCOUNTER — APPOINTMENT (OUTPATIENT)
Dept: CARDIOLOGY | Facility: CLINIC | Age: 46
End: 2021-07-01
Payer: MEDICAID

## 2021-07-01 VITALS
SYSTOLIC BLOOD PRESSURE: 118 MMHG | HEIGHT: 65 IN | RESPIRATION RATE: 16 BRPM | OXYGEN SATURATION: 97 % | DIASTOLIC BLOOD PRESSURE: 83 MMHG | WEIGHT: 215 LBS | BODY MASS INDEX: 35.82 KG/M2 | HEART RATE: 85 BPM

## 2021-07-01 DIAGNOSIS — R01.1 CARDIAC MURMUR, UNSPECIFIED: ICD-10-CM

## 2021-07-01 PROCEDURE — 99214 OFFICE O/P EST MOD 30 MIN: CPT

## 2021-07-01 NOTE — PHYSICAL EXAM
[Normal S1, S2] : normal S1, S2 [Normal] : clear lung fields, good air entry, no respiratory distress [Soft] : abdomen soft [Non Tender] : non-tender [Normal Gait] : normal gait [No Edema] : no edema [No Cyanosis] : no cyanosis [No Clubbing] : no clubbing [Moves all extremities] : moves all extremities [Alert and Oriented] : alert and oriented [de-identified] : II/VI soft systolic murmur at the base

## 2021-07-01 NOTE — DISCUSSION/SUMMARY
[FreeTextEntry1] : Patient is a 47yo F with DM, obesity, TOMMY here for eval of chest pain/SOB.\par -Echo/nuclear stress testing unremarkable 6/2021, symptoms non-cardiac \par -Lipids and BP controlled, A1C mildly elevated \par \par \par 1. Follow up PMD and consider pulm evaluation for dyspnea \par 2..Recommend aggressive diet and lifestyle modifications \par 3. Diabetes management per PMD. Counselled on diet/weight loss \par 4. Recommend 30 minutes moderate intensity aerobic activity 5 days per week, not very motivated however. May also help with her trouble sleeping\par 5. Follow up 1 year

## 2021-07-01 NOTE — ASSESSMENT
[FreeTextEntry1] : \par \par  HDL 57   (4/2021) \par A1C = 6.5 \par \par ECHO 6/2021:\par 1. TDS\par 2. Normal RV/LA/RA EF 65-70%\par 3. Normal RV/LA/RA \par 4. Mild TR, RVSP 29mmHg\par \par PHARM NUCLEAR STRESS TEST 6/2021:\par 1. Negative for ischemia/infarct, EF 72%\par 2. Normal HR/BP response \par

## 2021-07-01 NOTE — HISTORY OF PRESENT ILLNESS
[FreeTextEntry1] : PAtient is a 47yo F with DM, obesity, TOMMY here for follow up of chest pain. Also with some palps and dyspnea no underwent cardiac work up. Patient denies PND/orthopnea/edema/syncope/claudication \par \par Lives with  and 3 kids. Not working\par \par ROS: GI and  negative \par \par

## 2021-07-15 RX ORDER — KIT FOR THE PREPARATION OF TECHNETIUM TC99M SESTAMIBI 1 MG/5ML
INJECTION, POWDER, LYOPHILIZED, FOR SOLUTION PARENTERAL
Refills: 0 | Status: COMPLETED | OUTPATIENT
Start: 2021-07-15

## 2021-07-15 RX ADMIN — KIT FOR THE PREPARATION OF TECHNETIUM TC99M SESTAMIBI 0: 1 INJECTION, POWDER, LYOPHILIZED, FOR SOLUTION PARENTERAL at 00:00

## 2022-01-10 NOTE — ED ADULT NURSE NOTE - MODE OF DISCHARGE
Detail Level: Detailed Quality 431: Preventive Care And Screening: Unhealthy Alcohol Use - Screening: Patient not identified as an unhealthy alcohol user when screened for unhealthy alcohol use using a systematic screening method Quality 226: Preventive Care And Screening: Tobacco Use: Screening And Cessation Intervention: Patient screened for tobacco use and is an ex/non-smoker Ambulatory

## 2022-04-10 ENCOUNTER — RESULT CHARGE (OUTPATIENT)
Age: 47
End: 2022-04-10

## 2022-04-11 ENCOUNTER — APPOINTMENT (OUTPATIENT)
Dept: CARDIOLOGY | Facility: CLINIC | Age: 47
End: 2022-04-11
Payer: MEDICAID

## 2022-04-11 VITALS
RESPIRATION RATE: 16 BRPM | SYSTOLIC BLOOD PRESSURE: 138 MMHG | WEIGHT: 213 LBS | BODY MASS INDEX: 37.74 KG/M2 | DIASTOLIC BLOOD PRESSURE: 80 MMHG | HEART RATE: 77 BPM | HEIGHT: 63 IN

## 2022-04-11 PROCEDURE — 93000 ELECTROCARDIOGRAM COMPLETE: CPT

## 2022-04-11 PROCEDURE — 99214 OFFICE O/P EST MOD 30 MIN: CPT

## 2022-04-12 NOTE — HISTORY OF PRESENT ILLNESS
[FreeTextEntry1] : \par Mrs. Marcelino states that she was recently admitted to [facility] following a two-week course of progressive blurred vision, headache, and chest pain.  She was found to have a glucose level of 500 without evidence of DKA.  Troponins were negative x2.  MRI/MRA negative for acute intracranial abnormality.  \par \par \par

## 2022-04-12 NOTE — ASSESSMENT
[FreeTextEntry1] : 1.  EKG today reveals normal sinus rhythm at 77 bpm.  Normal intervals.  Borderline LVH voltage.  No ischemia. \par \par 2.  Hyperglycemia without DKA:  Patient improved with augmentation of oral hyperglycemic agents.  Currently on Metformin 1000 mg b.i.d. as well as Glimepiride 2 mg daily.  Patient to follow a strict low-carbohydrate diet and follow up with her PCP/endocrinologist.\par \par 3.  Chest pain:   Patient with intermittent episodes of retrosternal chest discomfort.  She has undergone a non-invasive cardiac evaluation last year which was negative for both structural heart disease via echo and negative for coronary artery ischemia via nuclear stress test.  Patient will undergo repeat echocardiography at this time and a coronary calcium score in order to further assess her coronary circulation.  Follow up office visit in approximately 3-4 weeks.

## 2022-04-12 NOTE — REASON FOR VISIT
[FreeTextEntry1] : Mrs. Marcelino is a pleasant 47-year-old  female with a past medical history significant for poorly controlled diabetes mellitus, obesity with associated obstructive sleep apnea, asthma, and polyarthralgia, who presents for follow up evaluation. \par

## 2022-04-12 NOTE — PHYSICAL EXAM
[Well Developed] : well developed [No Acute Distress] : no acute distress [Obese] : obese [Normal S1, S2] : normal S1, S2 [Clear Lung Fields] : clear lung fields [Normal Bowel Sounds] : normal bowel sounds [Normal Gait] : normal gait [No Edema] : no edema [No Rash] : no rash [Normal] : alert and oriented, normal memory [de-identified] : II/VI soft systolic murmur at the base

## 2022-04-19 ENCOUNTER — APPOINTMENT (OUTPATIENT)
Dept: CARDIOLOGY | Facility: CLINIC | Age: 47
End: 2022-04-19
Payer: MEDICAID

## 2022-04-19 PROCEDURE — 93306 TTE W/DOPPLER COMPLETE: CPT

## 2022-04-25 ENCOUNTER — NON-APPOINTMENT (OUTPATIENT)
Age: 47
End: 2022-04-25

## 2022-04-25 ENCOUNTER — APPOINTMENT (OUTPATIENT)
Dept: CARDIOLOGY | Facility: CLINIC | Age: 47
End: 2022-04-25
Payer: MEDICAID

## 2022-04-25 VITALS
SYSTOLIC BLOOD PRESSURE: 130 MMHG | HEART RATE: 81 BPM | WEIGHT: 218 LBS | RESPIRATION RATE: 16 BRPM | HEIGHT: 63 IN | DIASTOLIC BLOOD PRESSURE: 80 MMHG | BODY MASS INDEX: 38.62 KG/M2

## 2022-04-25 DIAGNOSIS — R07.9 CHEST PAIN, UNSPECIFIED: ICD-10-CM

## 2022-04-25 PROCEDURE — 93000 ELECTROCARDIOGRAM COMPLETE: CPT

## 2022-04-25 PROCEDURE — 99214 OFFICE O/P EST MOD 30 MIN: CPT

## 2022-04-25 RX ORDER — GLIMEPIRIDE 2 MG/1
2 TABLET ORAL
Qty: 180 | Refills: 0 | Status: ACTIVE | COMMUNITY
Start: 2022-04-04

## 2022-04-25 RX ORDER — METFORMIN HYDROCHLORIDE 1000 MG/1
1000 TABLET, COATED ORAL
Qty: 180 | Refills: 1 | Status: ACTIVE | COMMUNITY
Start: 2022-04-04

## 2022-04-25 RX ORDER — FOLIC ACID 1 MG/1
1 TABLET ORAL
Qty: 90 | Refills: 3 | Status: ACTIVE | COMMUNITY

## 2022-04-25 NOTE — PHYSICAL EXAM
[Well Developed] : well developed [Well Nourished] : well nourished [No Acute Distress] : no acute distress [Obese] : obese [Normal Conjunctiva] : normal conjunctiva [Normal Venous Pressure] : normal venous pressure [No Carotid Bruit] : no carotid bruit [Normal S1, S2] : normal S1, S2 [No Rub] : no rub [No Gallop] : no gallop [Clear Lung Fields] : clear lung fields [No Respiratory Distress] : no respiratory distress  [Soft] : abdomen soft [Normal Bowel Sounds] : normal bowel sounds [Normal Gait] : normal gait [No Edema] : no edema [No Rash] : no rash [No Skin Lesions] : no skin lesions [Moves all extremities] : moves all extremities [No Focal Deficits] : no focal deficits [Normal Speech] : normal speech [Alert and Oriented] : alert and oriented [Normal memory] : normal memory [de-identified] : II/VI systolic murmur

## 2022-04-25 NOTE — DISCUSSION/SUMMARY
[FreeTextEntry1] : 1 - Chest pain:   presents today without any further complaints of chest pain.  Denies shortness of breath, palpitations, lightheadedness or syncope.  Underwent both echocardiography and coronary calcium score.\par \par Echocardiogram (4/19/2022):  EF 60-65%.  Trace mitral valve regurgitation.  A false tendon is seen in the LV apical cavity.\par \par Coronary calcium score (4/13/2022):  Total coronary calcium score of 0 Agatston units.\par \par Patient was reassured.  Advised to follow strict low sodium, low fat, low cholesterol, low caloric diet as well as increase physical activity.\par \par 2 - Diabetes mellitus:  patient has not yet consulted with an endocrinologist for better diabetic control.  Has follow up with PCP and will then see endocrinology.\par \par 3 - Fasting blood work prior to follow up visit.\par \par 4 - Follow up with Dr. Dixon in 6 months.

## 2022-04-25 NOTE — HISTORY OF PRESENT ILLNESS
[FreeTextEntry1] : Mrs. Marcelino presents today without any further complaints of chest pain.  Denies shortness of breath, palpitations, lightheadedness or syncope.  Admits that she should could be better with her diet and physical activity.

## 2022-08-24 ENCOUNTER — APPOINTMENT (OUTPATIENT)
Dept: DERMATOLOGY | Facility: CLINIC | Age: 47
End: 2022-08-24

## 2022-09-12 NOTE — PROCEDURE
Problem: Infection  Goal: Absence of Infection Signs and Symptoms  Outcome: Ongoing, Progressing     Problem: Adult Inpatient Plan of Care  Goal: Plan of Care Review  Outcome: Ongoing, Progressing  Goal: Patient-Specific Goal (Individualized)  Outcome: Ongoing, Progressing  Goal: Absence of Hospital-Acquired Illness or Injury  Outcome: Ongoing, Progressing  Goal: Optimal Comfort and Wellbeing  Outcome: Ongoing, Progressing  Goal: Readiness for Transition of Care  Outcome: Ongoing, Progressing     Problem: Diabetes Comorbidity  Goal: Blood Glucose Level Within Targeted Range  Outcome: Ongoing, Progressing     Problem: Fall Injury Risk  Goal: Absence of Fall and Fall-Related Injury  Outcome: Ongoing, Progressing     Problem: Skin Injury Risk Increased  Goal: Skin Health and Integrity  Outcome: Ongoing, Progressing     Problem: Breathing Pattern Ineffective  Goal: Effective Breathing Pattern  Outcome: Ongoing, Progressing     Problem: Gas Exchange Impaired  Goal: Optimal Gas Exchange  Outcome: Ongoing, Progressing     Problem: Device-Related Complication Risk (Hemodialysis)  Goal: Safe, Effective Therapy Delivery  Outcome: Ongoing, Progressing     Problem: Hemodynamic Instability (Hemodialysis)  Goal: Effective Tissue Perfusion  Outcome: Ongoing, Progressing     Problem: Infection (Hemodialysis)  Goal: Absence of Infection Signs and Symptoms  Outcome: Ongoing, Progressing      [FreeTextEntry1] : sclerotherapy RLE [FreeTextEntry2] : venous insufficiency with pain [FreeTextEntry3] : Under aseptic technique, after informed consent obtained, the varicose incompetent veins of the right lower extremity were injected with 2 mL of 1% polidocanol. Pressure dressing applied. Pt tolerated well.\par \par

## 2022-10-04 ENCOUNTER — APPOINTMENT (OUTPATIENT)
Dept: CARDIOLOGY | Facility: CLINIC | Age: 47
End: 2022-10-04

## 2022-10-04 VITALS
WEIGHT: 207 LBS | RESPIRATION RATE: 16 BRPM | DIASTOLIC BLOOD PRESSURE: 80 MMHG | HEIGHT: 63 IN | SYSTOLIC BLOOD PRESSURE: 134 MMHG | HEART RATE: 70 BPM | BODY MASS INDEX: 36.68 KG/M2

## 2022-10-04 PROCEDURE — 99213 OFFICE O/P EST LOW 20 MIN: CPT | Mod: 25

## 2022-10-04 PROCEDURE — 93000 ELECTROCARDIOGRAM COMPLETE: CPT

## 2022-10-07 NOTE — HISTORY OF PRESENT ILLNESS
[FreeTextEntry1] : From a cardiac standpoint, Mrs. Marcelino denies exertional chest pain, shortness of breath, palpitations, or other cardiac symptoms.  \par

## 2022-10-07 NOTE — PHYSICAL EXAM
[Well Developed] : well developed [No Acute Distress] : no acute distress [Obese] : obese [Normal S1, S2] : normal S1, S2 [Clear Lung Fields] : clear lung fields [Normal Bowel Sounds] : normal bowel sounds [Normal Gait] : normal gait [No Edema] : no edema [No Rash] : no rash [Normal] : alert and oriented, normal memory [de-identified] : II/VI soft systolic murmur at the base

## 2022-10-07 NOTE — ASSESSMENT
[FreeTextEntry1] : 1.  EKG today reveals normal sinus rhythm at 70 bpm.  Normal intervals.  Borderline LVH.  No ischemic changes.  \par \par 2.  Obesity:  Patient with an 11-pound weight loss since April of this year following a low-carbohydrate diet but not exercising on a regular basis.  \par \par 3.  Diabetes mellitus:  Patient states that her diabetes has come under better control than in the past.  Continues to follow up with her PCP regarding this.  If clinically stable from a cardiac standpoint, follow up office visit.  \par   \par

## 2022-10-07 NOTE — REASON FOR VISIT
[FreeTextEntry1] : Mrs. Marcelino is a pleasant 47-year-old  female with a past medical history significant for obesity, poorly controlled diabetes mellitus, obstructive sleep apnea, asthma, and polyarthralgia, who presents for follow up evaluation. \par

## 2022-10-17 ENCOUNTER — APPOINTMENT (OUTPATIENT)
Dept: VASCULAR SURGERY | Facility: CLINIC | Age: 47
End: 2022-10-17

## 2022-10-17 VITALS
WEIGHT: 206 LBS | OXYGEN SATURATION: 99 % | DIASTOLIC BLOOD PRESSURE: 67 MMHG | BODY MASS INDEX: 36.5 KG/M2 | SYSTOLIC BLOOD PRESSURE: 106 MMHG | RESPIRATION RATE: 16 BRPM | TEMPERATURE: 96.9 F | HEIGHT: 63 IN | HEART RATE: 73 BPM

## 2022-10-17 PROCEDURE — 93970 EXTREMITY STUDY: CPT

## 2022-10-17 PROCEDURE — 99213 OFFICE O/P EST LOW 20 MIN: CPT

## 2022-10-17 NOTE — ASSESSMENT
[FreeTextEntry1] : 48 y/o female with painful left leg varicose veins. Pt also has itchy and painful reticular and spider veins. Failed conservative management with compression stockings. \par \par Plan:\par Plan for left leg phlebectomy followed by BLE slcerotherapy. The risks and benefits of the surgical procedure were discussed with patient, all questions were answered.\par Continue with conservative treatment: ambulation, compression and elevating legs above the level of the heart.\par Walk frequently.\par Remain well hydrated and follow a balanced diet.\par Use lymphedema pumps daily \par \par \par A total of 30 minutes was spent with patient and coordinating care\par

## 2022-10-17 NOTE — PHYSICAL EXAM
[Normal Breath Sounds] : Normal breath sounds [Normal Heart Sounds] : normal heart sounds [2+] : left 2+ [Ankle Swelling (On Exam)] : present [Ankle Swelling On The Right] : mild [Varicose Veins Of Lower Extremities] : present [Varicose Veins Of The Left Leg] : of the left leg [Ankle Swelling Bilaterally] : severe [] : present [de-identified] : WD, WN, NAD. Awake, alert, interactive. Ambulates without difficulty [FreeTextEntry1] : cap refill < 2 sec.\par There are numerous varicose veins through BLE.\par BLE edema with swelling to dorsal feet, mild truncal edema and hyperpigmentation to calves.\par No.s/s of infection

## 2022-10-17 NOTE — PROCEDURE
[FreeTextEntry1] : U/S Venous Duplex 7/17/19 demonstrates tortuous tributaries BLE R- 3.2 mm with an enlarged  2.1 mm; L- tortuous tributaries 5.2 mm and an enlarged  3.3 mm.\par \par U/S Venous Duplex BLE 11/26/19 demonstrated no DVT, SVT.

## 2022-10-17 NOTE — HISTORY OF PRESENT ILLNESS
[FreeTextEntry1] : 9/24/19: 43 y/o female RTC for followup of pain and swelling to legs. She continues to report pain and swelling of BLE, which starts in the back and radiates down her leg. Pain is described as sharp, shooting and burning. She is not taking medication for symptoms. She has had Sclerotherapy with a little improvement in varicose veins. Her legs and feet will swell and throb and be tender to the touch from thighs to feet. She is active, on her feet and elevates her legs She wears compression stockings frequently. No erythema, fever or chills. She received approval for Sclerotherapy that is good through 12/19.\par \par 11/26/19: 43 y/o female last seen 10/23/19 for sclerotherapy. She presents for sclerotherapy, however she continues to have bruising, calf pain and cramping.  No pain with walking or rest. Her legs and feet will swell and throb and be tender to the touch from thighs to feet. She is active, on her feet and elevates her legs She wears compression stockings frequently. No erythema, fever or chills. She received approval for Sclerotherapy that is good through 12/19.\par \par 10/17/22: Pt has been having severe pain in her left leg varicose veins. She also has many painful spider veins on her legs bilaterally. She has been complaint with thigh high compression stockings.

## 2023-01-10 ENCOUNTER — OUTPATIENT (OUTPATIENT)
Dept: OUTPATIENT SERVICES | Facility: HOSPITAL | Age: 48
LOS: 1 days | End: 2023-01-10
Payer: MEDICAID

## 2023-01-10 VITALS
TEMPERATURE: 97 F | HEIGHT: 63 IN | WEIGHT: 207.9 LBS | OXYGEN SATURATION: 99 % | HEART RATE: 64 BPM | SYSTOLIC BLOOD PRESSURE: 130 MMHG | RESPIRATION RATE: 18 BRPM | DIASTOLIC BLOOD PRESSURE: 80 MMHG

## 2023-01-10 DIAGNOSIS — Z98.89 OTHER SPECIFIED POSTPROCEDURAL STATES: Chronic | ICD-10-CM

## 2023-01-10 DIAGNOSIS — Z90.49 ACQUIRED ABSENCE OF OTHER SPECIFIED PARTS OF DIGESTIVE TRACT: Chronic | ICD-10-CM

## 2023-01-10 DIAGNOSIS — Z29.9 ENCOUNTER FOR PROPHYLACTIC MEASURES, UNSPECIFIED: ICD-10-CM

## 2023-01-10 DIAGNOSIS — U07.1 COVID-19: ICD-10-CM

## 2023-01-10 DIAGNOSIS — E11.9 TYPE 2 DIABETES MELLITUS WITHOUT COMPLICATIONS: ICD-10-CM

## 2023-01-10 DIAGNOSIS — J45.909 UNSPECIFIED ASTHMA, UNCOMPLICATED: ICD-10-CM

## 2023-01-10 DIAGNOSIS — Z86.79 PERSONAL HISTORY OF OTHER DISEASES OF THE CIRCULATORY SYSTEM: ICD-10-CM

## 2023-01-10 DIAGNOSIS — Z01.818 ENCOUNTER FOR OTHER PREPROCEDURAL EXAMINATION: ICD-10-CM

## 2023-01-10 LAB
A1C WITH ESTIMATED AVERAGE GLUCOSE RESULT: 6 % — HIGH (ref 4–5.6)
ANION GAP SERPL CALC-SCNC: 10 MMOL/L — SIGNIFICANT CHANGE UP (ref 5–17)
APTT BLD: 28.9 SEC — SIGNIFICANT CHANGE UP (ref 27.5–35.5)
BASOPHILS # BLD AUTO: 0.02 K/UL — SIGNIFICANT CHANGE UP (ref 0–0.2)
BASOPHILS NFR BLD AUTO: 0.2 % — SIGNIFICANT CHANGE UP (ref 0–2)
BLD GP AB SCN SERPL QL: SIGNIFICANT CHANGE UP
BUN SERPL-MCNC: 19.2 MG/DL — SIGNIFICANT CHANGE UP (ref 8–20)
CALCIUM SERPL-MCNC: 9.7 MG/DL — SIGNIFICANT CHANGE UP (ref 8.4–10.5)
CHLORIDE SERPL-SCNC: 102 MMOL/L — SIGNIFICANT CHANGE UP (ref 96–108)
CO2 SERPL-SCNC: 30 MMOL/L — HIGH (ref 22–29)
CREAT SERPL-MCNC: 0.64 MG/DL — SIGNIFICANT CHANGE UP (ref 0.5–1.3)
EGFR: 108 ML/MIN/1.73M2 — SIGNIFICANT CHANGE UP
EOSINOPHIL # BLD AUTO: 0.13 K/UL — SIGNIFICANT CHANGE UP (ref 0–0.5)
EOSINOPHIL NFR BLD AUTO: 1.2 % — SIGNIFICANT CHANGE UP (ref 0–6)
ESTIMATED AVERAGE GLUCOSE: 126 MG/DL — HIGH (ref 68–114)
GLUCOSE SERPL-MCNC: 97 MG/DL — SIGNIFICANT CHANGE UP (ref 70–99)
HCT VFR BLD CALC: 40.3 % — SIGNIFICANT CHANGE UP (ref 34.5–45)
HGB BLD-MCNC: 12.5 G/DL — SIGNIFICANT CHANGE UP (ref 11.5–15.5)
IMM GRANULOCYTES NFR BLD AUTO: 0.4 % — SIGNIFICANT CHANGE UP (ref 0–0.9)
INR BLD: 1.03 RATIO — SIGNIFICANT CHANGE UP (ref 0.88–1.16)
LYMPHOCYTES # BLD AUTO: 19 % — SIGNIFICANT CHANGE UP (ref 13–44)
LYMPHOCYTES # BLD AUTO: 2.05 K/UL — SIGNIFICANT CHANGE UP (ref 1–3.3)
MCHC RBC-ENTMCNC: 25.4 PG — LOW (ref 27–34)
MCHC RBC-ENTMCNC: 31 GM/DL — LOW (ref 32–36)
MCV RBC AUTO: 81.7 FL — SIGNIFICANT CHANGE UP (ref 80–100)
MONOCYTES # BLD AUTO: 0.76 K/UL — SIGNIFICANT CHANGE UP (ref 0–0.9)
MONOCYTES NFR BLD AUTO: 7 % — SIGNIFICANT CHANGE UP (ref 2–14)
NEUTROPHILS # BLD AUTO: 7.79 K/UL — HIGH (ref 1.8–7.4)
NEUTROPHILS NFR BLD AUTO: 72.2 % — SIGNIFICANT CHANGE UP (ref 43–77)
PLATELET # BLD AUTO: 304 K/UL — SIGNIFICANT CHANGE UP (ref 150–400)
POTASSIUM SERPL-MCNC: 5.2 MMOL/L — SIGNIFICANT CHANGE UP (ref 3.5–5.3)
POTASSIUM SERPL-SCNC: 5.2 MMOL/L — SIGNIFICANT CHANGE UP (ref 3.5–5.3)
PROTHROM AB SERPL-ACNC: 11.9 SEC — SIGNIFICANT CHANGE UP (ref 10.5–13.4)
RBC # BLD: 4.93 M/UL — SIGNIFICANT CHANGE UP (ref 3.8–5.2)
RBC # FLD: 14.6 % — HIGH (ref 10.3–14.5)
SODIUM SERPL-SCNC: 142 MMOL/L — SIGNIFICANT CHANGE UP (ref 135–145)
WBC # BLD: 10.79 K/UL — HIGH (ref 3.8–10.5)
WBC # FLD AUTO: 10.79 K/UL — HIGH (ref 3.8–10.5)

## 2023-01-10 PROCEDURE — 93010 ELECTROCARDIOGRAM REPORT: CPT

## 2023-01-10 PROCEDURE — G0463: CPT

## 2023-01-10 PROCEDURE — 93005 ELECTROCARDIOGRAM TRACING: CPT

## 2023-01-10 NOTE — H&P PST ADULT - HISTORY OF PRESENT ILLNESS
47 yr old female with history of obesity, diabetes , varicose veins, asthma and  anemia presents with c/o lower leg heaviness, discomfort and occasional swelling.  Pt had sclerotherapy in 2019 with temporary relief. Pt works as a health care aide and standing on feet makes symptoms worse . LAying down and elevating legs gives relief. Pt is scheduled for  a left leg phlebectomy with Dr. Ruth Frye on 1/12/23.

## 2023-01-10 NOTE — H&P PST ADULT - HEMATOLOGY/LYMPHATICS
OB/GYN  PN Visit  Hitesh Ogden  [id number]  2020  12:05 PM      S: Pt is a 21 y o   woman at 33w2d  by LMP c/w U/S here for PN visit  She denies contractions  She denies leakage of fluid and vaginal bleeding  She endorses good fetal movement  Her pregnancy is complicated by anemia (Hb 10 2 on 20), currently asymptomatic  She reports occasional uterine cramps, which are neither intense nor regular  Today we discussed PPBC options; she decided on Nexplanon      Dating:  LMP 19 --> NIGHAT 20  US on 20 @ 8'1 --> NIGHAT 20  Working NIGHAT --> 20 by LMP c/w 8' U/S    Plan: breast / Nexplanon      O:  Vitals:    20 1108   BP: 113/77   Pulse: 80   Temp: 97 9 °F (36 6 °C)     Physical Exam  Fundal height: 33 cm  FHT: 145    A/P:  Problem List Items Addressed This Visit     None            Future Appointments   Date Time Provider Vipin Wang   2020 11:15 AM OBGYN PGY-3 RESIDENT [address]   2020  1:45 PM  [address]     RTC 2 weeks      Frank Nicole MD  OB/GYN PGY-1  2020  12:05 PM negative

## 2023-01-10 NOTE — H&P PST ADULT - NSICDXPASTSURGICALHX_GEN_ALL_CORE_FT
PAST SURGICAL HISTORY:  History of  section . ,     History of endometrial ablation ()    S/P appendectomy     S/P cholecystectomy

## 2023-01-10 NOTE — H&P PST ADULT - NSICDXNOFAMILYHX_GEN_ALL_CORE
Final Anesthesia Post-op Assessment    Patient: Anayeli Burroughs  Procedure(s) Performed: LABOR ANALGESIA  Anesthesia type: L&D Epidural    Vitals Value Taken Time   Temp 36.6 °C (97.8 °F) 01/18/22 0930   Pulse 84 01/18/22 0930   Resp 16 01/18/22 0930   SpO2 99 % 01/18/22 0930   /62 01/18/22 0930         Patient Location: Labor and Delivery  Post-op Vital Signs:stable  Level of Consciousness: participates in exam and alert  Respiratory Status: spontaneous ventilation  Cardiovascular blood pressure returned to baseline  Hydration: euvolemic  Pain Management: well controlled  Vomiting: none  Nausea: None  Airway Patency:patent  Post-op Assessment: awake, alert, appropriately conversant, or baseline, no complications and moving all extremities      No complications documented.   
<-- Click to add NO pertinent Family History

## 2023-01-10 NOTE — H&P PST ADULT - NSICDXPASTMEDICALHX_GEN_ALL_CORE_FT
PAST MEDICAL HISTORY:  Anemia     Asthma "never had attack"    Diabetes mellitus     H/O varicose veins     Migraines     Murmur childhood

## 2023-01-11 ENCOUNTER — TRANSCRIPTION ENCOUNTER (OUTPATIENT)
Age: 48
End: 2023-01-11

## 2023-01-11 NOTE — ASU PATIENT PROFILE, ADULT - HISTORY OF COVID-19 VACCINATION
Vaccine status unknown Xolair Pregnancy And Lactation Text: This medication is Pregnancy Category B and is considered safe during pregnancy. This medication is excreted in breast milk.

## 2023-01-12 ENCOUNTER — OUTPATIENT (OUTPATIENT)
Dept: INPATIENT UNIT | Facility: HOSPITAL | Age: 48
LOS: 1 days | End: 2023-01-12
Payer: MEDICAID

## 2023-01-12 ENCOUNTER — TRANSCRIPTION ENCOUNTER (OUTPATIENT)
Age: 48
End: 2023-01-12

## 2023-01-12 ENCOUNTER — RESULT REVIEW (OUTPATIENT)
Age: 48
End: 2023-01-12

## 2023-01-12 ENCOUNTER — APPOINTMENT (OUTPATIENT)
Dept: VASCULAR SURGERY | Facility: HOSPITAL | Age: 48
End: 2023-01-12

## 2023-01-12 VITALS
WEIGHT: 207.9 LBS | OXYGEN SATURATION: 100 % | SYSTOLIC BLOOD PRESSURE: 111 MMHG | DIASTOLIC BLOOD PRESSURE: 70 MMHG | RESPIRATION RATE: 16 BRPM | HEIGHT: 63 IN | TEMPERATURE: 98 F | HEART RATE: 90 BPM

## 2023-01-12 VITALS
SYSTOLIC BLOOD PRESSURE: 132 MMHG | OXYGEN SATURATION: 100 % | TEMPERATURE: 98 F | RESPIRATION RATE: 14 BRPM | HEART RATE: 81 BPM | DIASTOLIC BLOOD PRESSURE: 76 MMHG

## 2023-01-12 DIAGNOSIS — I83.11 VARICOSE VEINS OF RIGHT LOWER EXTREMITY WITH INFLAMMATION: ICD-10-CM

## 2023-01-12 DIAGNOSIS — Z90.49 ACQUIRED ABSENCE OF OTHER SPECIFIED PARTS OF DIGESTIVE TRACT: Chronic | ICD-10-CM

## 2023-01-12 DIAGNOSIS — Z98.89 OTHER SPECIFIED POSTPROCEDURAL STATES: Chronic | ICD-10-CM

## 2023-01-12 DIAGNOSIS — I83.12 VARICOSE VEINS OF LEFT LOWER EXTREMITY WITH INFLAMMATION: ICD-10-CM

## 2023-01-12 PROCEDURE — 36415 COLL VENOUS BLD VENIPUNCTURE: CPT

## 2023-01-12 PROCEDURE — 88304 TISSUE EXAM BY PATHOLOGIST: CPT | Mod: 26

## 2023-01-12 PROCEDURE — 37765 STAB PHLEB VEINS XTR 10-20: CPT | Mod: LT

## 2023-01-12 PROCEDURE — 82962 GLUCOSE BLOOD TEST: CPT

## 2023-01-12 PROCEDURE — 88304 TISSUE EXAM BY PATHOLOGIST: CPT

## 2023-01-12 RX ORDER — ACETAMINOPHEN 500 MG
1000 TABLET ORAL ONCE
Refills: 0 | Status: COMPLETED | OUTPATIENT
Start: 2023-01-12 | End: 2023-01-12

## 2023-01-12 RX ORDER — CEFAZOLIN SODIUM 1 G
2000 VIAL (EA) INJECTION ONCE
Refills: 0 | Status: DISCONTINUED | OUTPATIENT
Start: 2023-01-12 | End: 2023-01-12

## 2023-01-12 RX ORDER — CEFAZOLIN SODIUM 1 G
2000 VIAL (EA) INJECTION ONCE
Refills: 0 | Status: COMPLETED | OUTPATIENT
Start: 2023-01-12 | End: 2023-01-12

## 2023-01-12 RX ORDER — FOLIC ACID 0.8 MG
1 TABLET ORAL
Qty: 0 | Refills: 0 | DISCHARGE

## 2023-01-12 RX ORDER — CETIRIZINE HYDROCHLORIDE 10 MG/1
1 TABLET ORAL
Qty: 0 | Refills: 0 | DISCHARGE

## 2023-01-12 RX ORDER — CHOLECALCIFEROL (VITAMIN D3) 125 MCG
1 CAPSULE ORAL
Qty: 0 | Refills: 0 | DISCHARGE

## 2023-01-12 RX ORDER — METFORMIN HYDROCHLORIDE 850 MG/1
1 TABLET ORAL
Qty: 0 | Refills: 0 | DISCHARGE

## 2023-01-12 RX ORDER — GLIMEPIRIDE 1 MG
1 TABLET ORAL
Qty: 0 | Refills: 0 | DISCHARGE

## 2023-01-12 RX ORDER — ALBUTEROL 90 UG/1
2 AEROSOL, METERED ORAL
Qty: 0 | Refills: 0 | DISCHARGE

## 2023-01-12 RX ORDER — SODIUM CHLORIDE 9 MG/ML
1000 INJECTION, SOLUTION INTRAVENOUS
Refills: 0 | Status: DISCONTINUED | OUTPATIENT
Start: 2023-01-12 | End: 2023-01-12

## 2023-01-12 RX ORDER — FENTANYL CITRATE 50 UG/ML
25 INJECTION INTRAVENOUS
Refills: 0 | Status: DISCONTINUED | OUTPATIENT
Start: 2023-01-12 | End: 2023-01-12

## 2023-01-12 RX ADMIN — Medication 1000 MILLIGRAM(S): at 14:25

## 2023-01-12 RX ADMIN — Medication 400 MILLIGRAM(S): at 14:10

## 2023-01-12 RX ADMIN — Medication 2000 MILLIGRAM(S): at 12:58

## 2023-01-12 NOTE — ASU DISCHARGE PLAN (ADULT/PEDIATRIC) - CARE PROVIDER_API CALL
Wei Frederick)  Surgery; Vascular Surgery  [address] [facility], [address]st [place] [address]  Phone: [phone]  Fax: [phone]  Follow Up Time: 1 month

## 2023-01-12 NOTE — ASU DISCHARGE PLAN (ADULT/PEDIATRIC) - NS MD DC FALL RISK RISK
For information on Fall & Injury Prevention, visit: [email or web address] OR  [email or web address] OR  https://www.cdc.gov/steadi/patient.html

## 2023-01-12 NOTE — ASU PREOP CHECKLIST - CHLOROHEXIDINE WASH 1
Physical Therapy Daily Treatment Note  Date:  10/31/2018    Patient Name:  Solo Andujar    :  1961  MRN: [id number]  Other position/activity restrictions: none   Diagnosis: R knee pain; LBP  Treatment Diagnosis: B knee pain/ unsteadiness w/ walking  Date of injury/Date of Surgery:chronic pain   Insurance: [employer]   Practitioner- Roxane Stone  Referring Practitioner Follow-Up:     POC Signed: pending  POC Date Range:  -18  Progress Note Due:  10 sessions  Visit# / total visits:8/10                    Goals:   Long term goals  Time Frame for Long term goals : check @ 10 sessions-18  Long term goal 1: compliant with HEP  Long term goal 2: 10/28 KOOS jr/ @ eval=  Patient goals : less knee pain    Summary of Eval:     Patient primary complaints:knee pain  History of condition:@ least 4 years  Current functional limitations: requires cane,  pain with all ADL  PLOF:sedentary  Prominent clinical findings:knee EXT weakness/ knee ROM WNL -painful  Progressive treatment plan will emphasize:knee EXT strength/HEP  Barriers to learning:[place]  Potential barriers to progress:chronic pain  The patient appears/reports motivated to regain PLOF: yes  INITIAL PAIN LEVEL: 6-7/10 R knee   SUBJECTIVE: pt reports R knee pain with ADL    Any changes to Ambulatory Summary Sheet? Any major status changes?      ACTIVITIES: Date 10/3/18 #4 10/10/18 #5 10/17/18 #6 10/24/18 (7) 10/31/18 (8)   Outcomes Measure        Vision fitness bike l2 x15 min L3 12 x 15 min Recumbent x 5 min Recumbent x 5 min Recumbent x 5 min     LAQ 2x5 reps R/L 2x5 reps R/L x15  x 15 reps R/L x 15 reps R/L     SLR 10 reps R/L 10 reps R/L 2 x10 2x10 reps R/L 2x10 reps R/L   PROM cher knees Heel slides 10 reps R/L min A ---- -------         nustep x10 min nustep seat 6 WL 3  x10 min nustep seat 6 WL 3  x10 min 10-Maximilian-2023

## 2023-01-12 NOTE — BRIEF OPERATIVE NOTE - NSICDXBRIEFPROCEDURE_GEN_ALL_CORE_FT
PROCEDURES:  Stab phlebectomy, varicose vein, 10 to 20 stab incisions 12-Jan-2023 13:30:23  Edinson Ramírez

## 2023-01-17 PROBLEM — E11.9 TYPE 2 DIABETES MELLITUS WITHOUT COMPLICATIONS: Chronic | Status: ACTIVE | Noted: 2023-01-10

## 2023-01-17 PROBLEM — Z86.79 PERSONAL HISTORY OF OTHER DISEASES OF THE CIRCULATORY SYSTEM: Chronic | Status: ACTIVE | Noted: 2023-01-10

## 2023-01-19 LAB — SURGICAL PATHOLOGY STUDY: SIGNIFICANT CHANGE UP

## 2023-01-25 ENCOUNTER — APPOINTMENT (OUTPATIENT)
Dept: VASCULAR SURGERY | Facility: CLINIC | Age: 48
End: 2023-01-25
Payer: MEDICAID

## 2023-01-25 VITALS
HEART RATE: 83 BPM | HEIGHT: 63 IN | SYSTOLIC BLOOD PRESSURE: 131 MMHG | BODY MASS INDEX: 36.86 KG/M2 | WEIGHT: 208 LBS | RESPIRATION RATE: 16 BRPM | DIASTOLIC BLOOD PRESSURE: 84 MMHG | OXYGEN SATURATION: 99 % | TEMPERATURE: 97.6 F

## 2023-01-25 PROCEDURE — 99024 POSTOP FOLLOW-UP VISIT: CPT

## 2023-01-25 RX ORDER — LIDOCAINE 4% 4 G/100G
4 CREAM TOPICAL
Qty: 2 | Refills: 0 | Status: ACTIVE | COMMUNITY
Start: 2023-01-25 | End: 1900-01-01

## 2023-01-25 RX ORDER — TRAMADOL HYDROCHLORIDE 100 MG/1
100 CAPSULE ORAL
Qty: 20 | Refills: 0 | Status: ACTIVE | COMMUNITY
Start: 2023-01-25 | End: 1900-01-01

## 2023-01-25 RX ORDER — TRIAMCINOLONE ACETONIDE 5 MG/G
0.5 CREAM TOPICAL
Qty: 30 | Refills: 0 | Status: ACTIVE | COMMUNITY
Start: 2023-01-25 | End: 1900-01-01

## 2023-01-25 NOTE — ASSESSMENT
[FreeTextEntry1] : 48 y/o female s/p LLE phlebectomy. Pt has ecchymosis and induration around incisions which is WNL s/p phlebectomy. \par \par Pt counseled on surgery performed and that everything she is experiencing is normal post op. \par Continue with conservative treatment: ambulation, compression and elevating legs above the level of the heart.\par Walk frequently.\par Remain well hydrated and follow a balanced diet.\par Plan for BLE slcerotherapy. The risks and benefits of the surgical procedure were discussed with patient, all questions were answered.\par \par \par A total of 20 minutes was spent with patient and coordinating care\par

## 2023-01-25 NOTE — PHYSICAL EXAM
[Normal Breath Sounds] : Normal breath sounds [Normal Heart Sounds] : normal heart sounds [2+] : left 2+ [Ankle Swelling (On Exam)] : present [Ankle Swelling On The Right] : mild [Varicose Veins Of Lower Extremities] : present [Varicose Veins Of The Left Leg] : of the left leg [Ankle Swelling Bilaterally] : severe [] : present [de-identified] : WD, WN, NAD. Awake, alert, interactive. Ambulates without difficulty [FreeTextEntry1] : several left leg incisions about 1 cm long. Ecchymosis and induration around incisions, WNL s/p phlebectomy \par No.s/s of infection

## 2023-01-25 NOTE — HISTORY OF PRESENT ILLNESS
[FreeTextEntry1] : 9/24/19: 45 y/o female RTC for followup of pain and swelling to legs. She continues to report pain and swelling of BLE, which starts in the back and radiates down her leg. Pain is described as sharp, shooting and burning. She is not taking medication for symptoms. She has had Sclerotherapy with a little improvement in varicose veins. Her legs and feet will swell and throb and be tender to the touch from thighs to feet. She is active, on her feet and elevates her legs She wears compression stockings frequently. No erythema, fever or chills. She received approval for Sclerotherapy that is good through 12/19.\par \par 11/26/19: 45 y/o female last seen 10/23/19 for sclerotherapy. She presents for sclerotherapy, however she continues to have bruising, calf pain and cramping.  No pain with walking or rest. Her legs and feet will swell and throb and be tender to the touch from thighs to feet. She is active, on her feet and elevates her legs She wears compression stockings frequently. No erythema, fever or chills. She received approval for Sclerotherapy that is good through 12/19.\par \par 10/17/22: Pt has been having severe pain in her left leg varicose veins. She also has many painful spider veins on her legs bilaterally. She has been complaint with thigh high compression stockings. \par \par 1/25/23: Pt is s/p LLE phlebectomy. She has been having pain around her left leg incisions. She also has several areas that are hard around the incisions. She has some numbness in medial calf area. She denies any fever or chills. \par \par PSHx: \par 1/12/23 LLE phlebectomy

## 2023-02-22 ENCOUNTER — APPOINTMENT (OUTPATIENT)
Dept: VASCULAR SURGERY | Facility: CLINIC | Age: 48
End: 2023-02-22

## 2023-03-06 ENCOUNTER — APPOINTMENT (OUTPATIENT)
Dept: VASCULAR SURGERY | Facility: CLINIC | Age: 48
End: 2023-03-06
Payer: MEDICAID

## 2023-03-06 VITALS
HEIGHT: 63 IN | HEART RATE: 80 BPM | OXYGEN SATURATION: 98 % | BODY MASS INDEX: 36.14 KG/M2 | DIASTOLIC BLOOD PRESSURE: 78 MMHG | SYSTOLIC BLOOD PRESSURE: 133 MMHG | TEMPERATURE: 97.1 F | RESPIRATION RATE: 14 BRPM | WEIGHT: 204 LBS

## 2023-03-06 DIAGNOSIS — I83.12 VARICOSE VEINS OF RIGHT LOWER EXTREMITY WITH INFLAMMATION: ICD-10-CM

## 2023-03-06 DIAGNOSIS — I83.11 VARICOSE VEINS OF RIGHT LOWER EXTREMITY WITH INFLAMMATION: ICD-10-CM

## 2023-03-06 PROCEDURE — 99213 OFFICE O/P EST LOW 20 MIN: CPT

## 2023-03-07 PROBLEM — I83.11 VARICOSE VEINS OF BOTH LOWER EXTREMITIES WITH INFLAMMATION: Status: ACTIVE | Noted: 2017-09-15

## 2023-03-07 NOTE — PHYSICAL EXAM
[Normal Breath Sounds] : Normal breath sounds [Normal Heart Sounds] : normal heart sounds [2+] : left 2+ [Ankle Swelling (On Exam)] : present [Ankle Swelling On The Right] : mild [Varicose Veins Of Lower Extremities] : present [Varicose Veins Of The Left Leg] : of the left leg [Ankle Swelling Bilaterally] : severe [] : present [de-identified] : WD, WN, NAD. Awake, alert, interactive. Ambulates without difficulty [FreeTextEntry1] : several left leg incisions about 1 cm long. Ecchymosis and induration around incisions, WNL s/p phlebectomy \par No.s/s of infection

## 2023-03-07 NOTE — ASSESSMENT
[FreeTextEntry1] : 47y/o female s/p LLE phlebectomy. Pt has ecchymosis and induration around incisions which is WNL s/p phlebectomy. Pt also has painful spider and reticular veins. \par \par Pt counseled on surgery performed and that everything she is experiencing is normal post op. \par Continue with conservative treatment: ambulation, compression and elevating legs above the level of the heart.\par Walk frequently.\par Remain well hydrated and follow a balanced diet.\par Plan for BLE slcerotherapy. The risks and benefits of the surgical procedure were discussed with patient, all questions were answered.\par \par \par A total of 20 minutes was spent with patient and coordinating care\par

## 2023-03-28 ENCOUNTER — APPOINTMENT (OUTPATIENT)
Dept: CARDIOLOGY | Facility: CLINIC | Age: 48
End: 2023-03-28

## 2023-07-13 ENCOUNTER — APPOINTMENT (OUTPATIENT)
Dept: VASCULAR SURGERY | Facility: CLINIC | Age: 48
End: 2023-07-13

## 2023-08-21 ENCOUNTER — APPOINTMENT (OUTPATIENT)
Dept: VASCULAR SURGERY | Facility: CLINIC | Age: 48
End: 2023-08-21

## 2023-08-22 ENCOUNTER — NON-APPOINTMENT (OUTPATIENT)
Age: 48
End: 2023-08-22

## 2023-08-24 ENCOUNTER — NON-APPOINTMENT (OUTPATIENT)
Age: 48
End: 2023-08-24

## 2023-08-24 ENCOUNTER — APPOINTMENT (OUTPATIENT)
Dept: CARDIOLOGY | Facility: CLINIC | Age: 48
End: 2023-08-24
Payer: MEDICAID

## 2023-08-24 VITALS
BODY MASS INDEX: 36.32 KG/M2 | RESPIRATION RATE: 16 BRPM | HEART RATE: 83 BPM | SYSTOLIC BLOOD PRESSURE: 124 MMHG | WEIGHT: 205 LBS | HEIGHT: 63 IN | DIASTOLIC BLOOD PRESSURE: 70 MMHG

## 2023-08-24 DIAGNOSIS — R53.82 CHRONIC FATIGUE, UNSPECIFIED: ICD-10-CM

## 2023-08-24 DIAGNOSIS — R60.9 EDEMA, UNSPECIFIED: ICD-10-CM

## 2023-08-24 PROCEDURE — 93000 ELECTROCARDIOGRAM COMPLETE: CPT

## 2023-08-24 PROCEDURE — 99214 OFFICE O/P EST MOD 30 MIN: CPT | Mod: 25

## 2023-08-28 NOTE — PHYSICAL EXAM
[Well Developed] : well developed [No Acute Distress] : no acute distress [Obese] : obese [Normal S1, S2] : normal S1, S2 [Clear Lung Fields] : clear lung fields [Normal Bowel Sounds] : normal bowel sounds [Normal Gait] : normal gait [No Edema] : no edema [No Rash] : no rash [Normal] : alert and oriented, normal memory [de-identified] : II/VI soft systolic murmur at the base

## 2023-08-28 NOTE — HISTORY OF PRESENT ILLNESS
[FreeTextEntry1] : From a cardiac standpoint, Mrs. Marcelino presents today without complaints of chest pain or shortness of breath. She has been experiencing intermittent palpitations which occur 2-3 times per week, at times while in bed. Denies lightheadedness or syncope.

## 2023-08-28 NOTE — REASON FOR VISIT
[FreeTextEntry1] : Mrs. Marcelino is a pleasant 48-year-old  female with a past medical history significant for obesity, poorly controlled diabetes mellitus, obstructive sleep apnea, asthma, and polyarthralgia, who presents for follow up evaluation.

## 2023-08-28 NOTE — ASSESSMENT
[FreeTextEntry1] : 1. EKG today demonstrates normal sinus rhythm at 83 bpm. Normal intervals. No evidence of ischemia.   2. Palpitations: Patient with intermittent bouts of palpitations. Will have her undergo 30-day ambulatory event monitoring as well as echocardiography for further evaluation. Fasting bloodwork will also be obtained. If clinically stable, office visit six weeks.

## 2023-10-06 ENCOUNTER — APPOINTMENT (OUTPATIENT)
Dept: CARDIOLOGY | Facility: CLINIC | Age: 48
End: 2023-10-06

## 2023-10-12 ENCOUNTER — APPOINTMENT (OUTPATIENT)
Dept: CARDIOLOGY | Facility: CLINIC | Age: 48
End: 2023-10-12
Payer: MEDICAID

## 2023-10-12 PROCEDURE — 93306 TTE W/DOPPLER COMPLETE: CPT

## 2023-11-17 ENCOUNTER — APPOINTMENT (OUTPATIENT)
Dept: CARDIOLOGY | Facility: CLINIC | Age: 48
End: 2023-11-17

## 2023-11-21 ENCOUNTER — APPOINTMENT (OUTPATIENT)
Dept: CARDIOLOGY | Facility: CLINIC | Age: 48
End: 2023-11-21

## 2024-01-10 ENCOUNTER — OFFICE (OUTPATIENT)
Dept: URBAN - METROPOLITAN AREA CLINIC 94 | Facility: CLINIC | Age: 49
Setting detail: OPHTHALMOLOGY
End: 2024-01-10
Payer: MEDICAID

## 2024-01-10 DIAGNOSIS — H01.002: ICD-10-CM

## 2024-01-10 DIAGNOSIS — H01.001: ICD-10-CM

## 2024-01-10 DIAGNOSIS — H04.123: ICD-10-CM

## 2024-01-10 DIAGNOSIS — H35.361: ICD-10-CM

## 2024-01-10 DIAGNOSIS — H01.005: ICD-10-CM

## 2024-01-10 DIAGNOSIS — H40.033: ICD-10-CM

## 2024-01-10 DIAGNOSIS — E11.3293: ICD-10-CM

## 2024-01-10 DIAGNOSIS — H43.393: ICD-10-CM

## 2024-01-10 DIAGNOSIS — H01.004: ICD-10-CM

## 2024-01-10 DIAGNOSIS — H25.13: ICD-10-CM

## 2024-01-10 PROCEDURE — 99213 OFFICE O/P EST LOW 20 MIN: CPT | Performed by: REGISTERED NURSE

## 2024-01-10 PROCEDURE — 92250 FUNDUS PHOTOGRAPHY W/I&R: CPT | Performed by: REGISTERED NURSE

## 2024-01-10 ASSESSMENT — REFRACTION_MANIFEST
OD_VA1: 20/25
OS_AXIS: 135
OS_CYLINDER: -0.75
OS_SPHERE: PLANO
OS_ADD: +1.50
OS_CYLINDER: SPHERE
OS_VA1: 20/25-
OS_SPHERE: +1.75
OS_SPHERE: +0.25
OS_VA1: 20/25
OD_VA1: 20/30
OD_CYLINDER: SPHERE
OS_CYLINDER: SPH
OD_SPHERE: +2.00
OD_ADD: +1.50
OD_SPHERE: PLANO
OS_VA1: 20/30

## 2024-01-10 ASSESSMENT — REFRACTION_AUTOREFRACTION
OS_AXIS: 130
OS_CYLINDER: -0.50
OS_SPHERE: +0.75
OD_CYLINDER: -0.25
OD_AXIS: 035
OD_SPHERE: +0.75

## 2024-01-10 ASSESSMENT — CONFRONTATIONAL VISUAL FIELD TEST (CVF)
OS_FINDINGS: FULL
OD_FINDINGS: FULL

## 2024-01-10 ASSESSMENT — REFRACTION_CURRENTRX
OD_SPHERE: +2.25
OS_SPHERE: +2.25
OD_VPRISM_DIRECTION: SV
OS_OVR_VA: 20/
OD_OVR_VA: 20/
OS_VPRISM_DIRECTION: SV

## 2024-01-10 ASSESSMENT — LID EXAM ASSESSMENTS
OS_BLEPHARITIS: LLL LUL 2+ 3+
OD_BLEPHARITIS: RLL RUL 2+ 3+

## 2024-01-10 ASSESSMENT — DRY EYES - PHYSICIAN NOTES: OD_GENERALCOMMENTS: GRADE 1 STAINING

## 2024-01-10 ASSESSMENT — SPHEQUIV_DERIVED
OS_SPHEQUIV: 0.5
OD_SPHEQUIV: 0.625

## 2024-01-10 ASSESSMENT — INCREASING TEAR LAKE - SEVERITY SCORE
OS_INC_TEARLAKE: 2+
OD_INC_TEARLAKE: 2+

## 2024-01-11 ENCOUNTER — EMERGENCY (EMERGENCY)
Facility: HOSPITAL | Age: 49
LOS: 1 days | Discharge: HOME | End: 2024-01-11
Attending: EMERGENCY MEDICINE
Payer: COMMERCIAL

## 2024-01-11 VITALS
OXYGEN SATURATION: 98 % | HEART RATE: 104 BPM | TEMPERATURE: 98 F | DIASTOLIC BLOOD PRESSURE: 68 MMHG | SYSTOLIC BLOOD PRESSURE: 132 MMHG | HEIGHT: 66 IN | RESPIRATION RATE: 18 BRPM | WEIGHT: 158.07 LBS

## 2024-01-11 VITALS
HEART RATE: 90 BPM | RESPIRATION RATE: 18 BRPM | SYSTOLIC BLOOD PRESSURE: 127 MMHG | OXYGEN SATURATION: 96 % | TEMPERATURE: 98 F | DIASTOLIC BLOOD PRESSURE: 67 MMHG

## 2024-01-11 DIAGNOSIS — Z98.89 OTHER SPECIFIED POSTPROCEDURAL STATES: Chronic | ICD-10-CM

## 2024-01-11 DIAGNOSIS — Z90.49 ACQUIRED ABSENCE OF OTHER SPECIFIED PARTS OF DIGESTIVE TRACT: Chronic | ICD-10-CM

## 2024-01-11 PROCEDURE — T1013: CPT

## 2024-01-11 PROCEDURE — 82962 GLUCOSE BLOOD TEST: CPT

## 2024-01-11 PROCEDURE — 99284 EMERGENCY DEPT VISIT MOD MDM: CPT

## 2024-01-11 PROCEDURE — 99283 EMERGENCY DEPT VISIT LOW MDM: CPT

## 2024-01-11 RX ORDER — SODIUM CHLORIDE 9 MG/ML
1000 INJECTION INTRAMUSCULAR; INTRAVENOUS; SUBCUTANEOUS ONCE
Refills: 0 | Status: COMPLETED | OUTPATIENT
Start: 2024-01-11 | End: 2024-01-11

## 2024-01-11 RX ADMIN — SODIUM CHLORIDE 1000 MILLILITER(S): 9 INJECTION INTRAMUSCULAR; INTRAVENOUS; SUBCUTANEOUS at 16:55

## 2024-01-11 NOTE — ED PROVIDER NOTE - PATIENT PORTAL LINK FT
You can access the FollowMyHealth Patient Portal offered by [facility] by registering at the following website: [email or web address]. By joining [employer]’s FollowMyHealth portal, you will also be able to view your health information using other applications (apps) compatible with our system. You can access the FollowMyHealth Patient Portal offered by [facility] by registering at the following website: [email or web address]. By joining "[employer]’s FollowMyHealth portal, you will also be able to view your health information using other applications (apps) compatible with our system.

## 2024-01-11 NOTE — ED ADULT NURSE NOTE - CAS ELECT INFOMATION PROVIDED
patient verbalizes understanding of and agrees with discharge instruction.  ambulated off unit without incident./DC instructions

## 2024-01-11 NOTE — ED PROVIDER NOTE - OBJECTIVE STATEMENT
Pertinent PMH/PSH/FHx/SHx and Review of Systems contained within:  Patient presents to the ED for anphylactoid reaction to MRI ocntrast.  patient had MRI with contrast and then began to feel her throat tightenting.  Given epi and steroids at facility and transported to ED.  occurred ~2 hours PTA.  Now baseline.   present for discussions with patient. family bedside.  Non toxic.  Well appearing. PMH of NIDDM2.  no distress.  no respiratory sx during hx.  No fever/chills,  No chest pain/palpitations, no SOB/cough/wheeze/stridor, No abdominal pain, No N/V/D, No neck/back pain, no rash, no changes in neurological status/function.

## 2024-01-11 NOTE — ED ADULT NURSE NOTE - OBJECTIVE STATEMENT
biba from outpatient radiology; pt reports she was having MRi w/ contrast and "developed allergic reaction"  + itchy + rash + "Felt throat itchy"  pt was given 0.3mg IM epi, 125mg solumedrol IVP and felt "immediate relief"  presents to ED w/ 22g left AC IV, denies sob/cp, speaking full sentences, able to swallow secretions , reports symptoms subsided PTA.

## 2024-01-11 NOTE — ED ADULT TRIAGE NOTE - IDEAL BODY WEIGHT(KG)
84 YO male with PMH of HTN, HLD,  Nonobstructive CAD, prostate cancer s/p RT,  DM II, Arthritis s/p Right total knee replacement in 2008, squamous cell carcinoma of scalp, BPH, ASHD, Chronic lower back pain secondary to Lumbar stenosis with neurogenic claudication, COVID 2020 who presented to [facility] on 1/24 with c/o  lower back pain radiating down to B/L legs that has  progressively worsened and has failed conservation treatments.    MRI of lumbar spine revealed  Convex left curvature with straightening of the lordosis, multilevel loss of disc signal and height and anterior spurring and bulging from L2-L3 through L5-S1. He is s/p right posterior spine  L3-L5 extreme lateral lumbar interbody fusion, L2-S1 posterior column osteotomy, facetectomy, foraminotomy, L3-L5 posterior instrumented fusion  paraspineous muscle flap wound closure on 1/24.  Patient now with gait Instability, ADL impairments and Functional impairments.    * Pain management- warm/cold compress- Lidocaine patch * Tizanidine 2 mg @ HS * Continue to monitor functional progress * Bowel regimen adjusted * Plan for DC 2/10 *     #Lumbar Radiculopathy  - MRI of lumbar spine revealed  Convex left curvature with straightening of the lordosis, multilevel loss of disc signal and height and anterior spurring and bulging from L2-L3 through L5-S1.   - now s/p   L3-L5 extreme lateral lumbar interbody fusion, L2-S1 posterior column osteotomy, facetectomy, foraminotomy, L3-L5 posterior instrumented fusion  paraspineous muscle flap wound closure on 1/24.  - Surgical site with dermabond- well approximated  - Start Comprehensive Rehab Program: PT/OT 3hours daily and 5 days weekly  - PT: Focused on improving strength, endurance, coordination, balance, functional mobility, and transfers  - OT: Focused on improving strength, fine motor skills, coordination, posture and ADLs.      #HLD  - Lipitor 80mg daily    #HTN  - Lisinopril  10mg daily  - Metoprolol 25mg BID    #DM II  - ISS and FS  - Admelog and Lantus    #BPH  - Flomax 0.4mg daily    #Pain management  - Tylenol PRN, Oxycodone PRN, Zanaflex 2mg prn  - Warm or cold compress  - Tizanidine 2 mg @ HS   - Lidocaine patch daily     #DVT ppx  -  Heparin, SCD, TEDs    #GI ppx  - Pepcid 20mg    #Bowel Regimen  - Senna DCd  - Miralax BID PRN 2/6  - Bowel regimen adjustment per hospitalist 2/6    #Bladder management  - BS on admission, and q 8 hours (SC if > 400)  - Monitor UO    #FEN   - Diet: Regular. Cardiac  - Supplements vit D, calcium    #Skin:  - Skin on admission: Thoracic spine incision + 2 drain site with surgiglue, Right flank incision with surgiglue BRANDI  - Pressure injury/Skin: Turn Q2hrs while in bed, OOB to Chair, PT/OT     #Sleep:   - Maintain quiet hours and low stim environment.  - Melatonin Dcd on 2/2  -- Tizanidine 2 mg @ HS    #Precaution  - Fall, Aspiration, cardiac, thoracic spine    IDT conference on 2/2  TDD: 2/10 to home  Barriers: Pain  Social Work: Lives in  with 3STE with 1st FL setup. Independent PTA.  OT: Min A/CGA for ADLs and transfers. Mod A for shower transfer.  PT: Min A with RW for transfers. Ambulated 50 ft with RW with min A. Negotiated 8 (6-inch) stairs with 2 HRs.   SLP: --   ----------------------------------------------------------------  Outpatient Follow-up (Specialty/Name of physician):  PCP: Dr. Tamela Mckeon MD  Plastic Surgery  [address] [place] [address]  [address]  [place]. [id number] [phone]    Leo Smith MD  Neurosurgery  [address] [place] [address]  [address]  [place]. [address]  [phone]  ----------------------------------------------------------------   59

## 2024-01-11 NOTE — ED PROVIDER NOTE - CLINICAL SUMMARY MEDICAL DECISION MAKING FREE TEXT BOX
patient with anaphylactoid reaction.  VSS.  Non toxic.  Well appearing. Patient observed and pending continued observation in ED.  Patient signed out to incoming physician.  All decisions regarding the progression of care will be made at their discretion.

## 2024-01-11 NOTE — ED PROVIDER NOTE - PROGRESS NOTE DETAILS
Maria Eugenia Bynum DO : Patient signed out to me by Dr. Zamorano. Patient reassessed- no signs of biphasic reaction at this time, no swelling/sob/n/v/rash. Patient offered PO steroids for dc however she declined, she is concerned about hyperglycemia resulting from steroid use as she has h/o DM. Will dc.

## 2024-01-11 NOTE — ED ADULT TRIAGE NOTE - CHIEF COMPLAINT QUOTE
pt BIBA from outpatient radiology s/p IV contrast reaction. pt given 125mg Solumedrol IVP and 0.3 mg Epi IM by radiology center and reporting relief. pt AOX3.

## 2024-01-11 NOTE — ED PROVIDER NOTE - BIRTH SEX
1058: To PACU from OR via gurney, sleeping, respirations spontaneous and non-labored via LMA.Icepack applied over c/d/i L knee surgical dressings. LLE immoblizer insitu, elevated on pillow.  1100: Rouses spontaneously, denies pain, returns to sleep  1110: More awake, DB&C, O2 d/myron, commenced po fluids  1125: No change in surgical site assessment. Meets criteria to transfer to Stage 2      
1137: To stage ll. Up and dressed w/ CNA assist. No pain or nausea.  1202: Home care instructions reviewed w/ pt and wife. Questions, concerns addressed. Meets criteria for discharge.  
Follow up phone call made to the patient and he stated that he had been taking off his immobilizer while icing and then ambulating to bathroom.  RN explained the importance of the immobilizer and how it should always be in place until otherwise instructed by surgeon.  Patient advised to continue icing and that it's ok to open immobilizer enough to ice knee but to always make sure the immobilizer is in place and secured before any ambulation or other movement.  Patient then asked if he should bring it to physical therapy, or wear it.  RN instructed patient to wear it to physical therapy, because he needs to always wear it unless otherwise instructed by surgeon.    
Hx and meds reviewed, pre op instructions given. Pt aware may take the listed meds morning of surgery; flomax .Pt states on 2 week course of Bactrim for BPH. Anesthesia fasting guidelines reviewed with pt.  
Female

## 2024-01-11 NOTE — ED ADULT NURSE REASSESSMENT NOTE - NS ED NURSE REASSESS COMMENT FT1
Report received from MORGAN Horner.  Patient resting comfortably in bed in NAD.  As per previous RN, patient likely being discharged s/p allergic reaction.  Will continue with plan of care.

## 2024-01-25 ENCOUNTER — NON-APPOINTMENT (OUTPATIENT)
Age: 49
End: 2024-01-25

## 2024-01-25 ENCOUNTER — APPOINTMENT (OUTPATIENT)
Dept: CARDIOLOGY | Facility: CLINIC | Age: 49
End: 2024-01-25
Payer: COMMERCIAL

## 2024-01-25 VITALS
BODY MASS INDEX: 36.5 KG/M2 | SYSTOLIC BLOOD PRESSURE: 124 MMHG | DIASTOLIC BLOOD PRESSURE: 72 MMHG | HEIGHT: 63 IN | HEART RATE: 74 BPM | RESPIRATION RATE: 16 BRPM | WEIGHT: 206 LBS

## 2024-01-25 DIAGNOSIS — R00.2 PALPITATIONS: ICD-10-CM

## 2024-01-25 DIAGNOSIS — R06.02 SHORTNESS OF BREATH: ICD-10-CM

## 2024-01-25 DIAGNOSIS — J45.909 UNSPECIFIED ASTHMA, UNCOMPLICATED: ICD-10-CM

## 2024-01-25 PROCEDURE — 99214 OFFICE O/P EST MOD 30 MIN: CPT | Mod: 25

## 2024-01-25 PROCEDURE — 93000 ELECTROCARDIOGRAM COMPLETE: CPT | Mod: 59

## 2024-01-25 RX ORDER — HYDROCHLOROTHIAZIDE 12.5 MG/1
12.5 TABLET ORAL DAILY
Qty: 30 | Refills: 3 | Status: DISCONTINUED | COMMUNITY
Start: 2023-08-24 | End: 2024-01-25

## 2024-01-25 RX ORDER — FUROSEMIDE 20 MG/1
20 TABLET ORAL DAILY
Qty: 90 | Refills: 0 | Status: ACTIVE | COMMUNITY
Start: 2024-01-25 | End: 1900-01-01

## 2024-02-03 ENCOUNTER — OFFICE (OUTPATIENT)
Dept: URBAN - METROPOLITAN AREA CLINIC 94 | Facility: CLINIC | Age: 49
Setting detail: OPHTHALMOLOGY
End: 2024-02-03
Payer: MEDICAID

## 2024-02-03 DIAGNOSIS — H01.001: ICD-10-CM

## 2024-02-03 DIAGNOSIS — H01.005: ICD-10-CM

## 2024-02-03 DIAGNOSIS — H01.002: ICD-10-CM

## 2024-02-03 DIAGNOSIS — H04.123: ICD-10-CM

## 2024-02-03 DIAGNOSIS — H01.004: ICD-10-CM

## 2024-02-03 DIAGNOSIS — H25.13: ICD-10-CM

## 2024-02-03 PROCEDURE — 99213 OFFICE O/P EST LOW 20 MIN: CPT | Performed by: REGISTERED NURSE

## 2024-02-03 PROCEDURE — 83861 MICROFLUID ANALY TEARS: CPT | Performed by: REGISTERED NURSE

## 2024-02-03 ASSESSMENT — REFRACTION_MANIFEST
OD_CYLINDER: SPHERE
OD_VA1: 20/25
OS_VA1: 20/30
OS_ADD: +1.50
OD_VA1: 20/30
OD_SPHERE: +2.00
OS_CYLINDER: -0.75
OS_SPHERE: +0.25
OS_AXIS: 135
OS_VA1: 20/25
OS_CYLINDER: SPH
OS_SPHERE: +1.75
OD_SPHERE: PLANO
OS_SPHERE: PLANO
OD_ADD: +1.50
OS_CYLINDER: SPHERE
OS_VA1: 20/25-

## 2024-02-03 ASSESSMENT — REFRACTION_AUTOREFRACTION
OS_SPHERE: +0.75
OS_AXIS: 130
OD_CYLINDER: -0.25
OD_AXIS: 035
OS_CYLINDER: -0.50
OD_SPHERE: +0.75

## 2024-02-03 ASSESSMENT — LID EXAM ASSESSMENTS
OD_BLEPHARITIS: RLL RUL 2+ 3+
OS_BLEPHARITIS: LLL LUL 2+ 3+

## 2024-02-03 ASSESSMENT — CONFRONTATIONAL VISUAL FIELD TEST (CVF)
OS_FINDINGS: FULL
OD_FINDINGS: FULL

## 2024-02-03 ASSESSMENT — DRY EYES - PHYSICIAN NOTES: OD_GENERALCOMMENTS: GRADE 1 STAINING

## 2024-02-03 ASSESSMENT — REFRACTION_CURRENTRX
OS_OVR_VA: 20/
OD_SPHERE: +2.25
OS_VPRISM_DIRECTION: SV
OD_VPRISM_DIRECTION: SV
OD_OVR_VA: 20/
OS_SPHERE: +2.25

## 2024-02-03 ASSESSMENT — INCREASING TEAR LAKE - SEVERITY SCORE
OS_INC_TEARLAKE: 2+
OD_INC_TEARLAKE: 2+

## 2024-02-03 ASSESSMENT — SPHEQUIV_DERIVED
OD_SPHEQUIV: 0.625
OS_SPHEQUIV: 0.5

## 2024-07-02 ENCOUNTER — APPOINTMENT (OUTPATIENT)
Dept: CARDIOLOGY | Facility: CLINIC | Age: 49
End: 2024-07-02
Payer: COMMERCIAL

## 2024-07-02 VITALS
WEIGHT: 209 LBS | SYSTOLIC BLOOD PRESSURE: 134 MMHG | HEIGHT: 63 IN | HEART RATE: 74 BPM | RESPIRATION RATE: 16 BRPM | BODY MASS INDEX: 37.03 KG/M2 | DIASTOLIC BLOOD PRESSURE: 64 MMHG

## 2024-07-02 DIAGNOSIS — E66.9 OBESITY, UNSPECIFIED: ICD-10-CM

## 2024-07-02 DIAGNOSIS — G47.33 OBSTRUCTIVE SLEEP APNEA (ADULT) (PEDIATRIC): ICD-10-CM

## 2024-07-02 DIAGNOSIS — E11.9 TYPE 2 DIABETES MELLITUS W/OUT COMPLICATIONS: ICD-10-CM

## 2024-07-02 PROCEDURE — 99214 OFFICE O/P EST MOD 30 MIN: CPT

## 2024-07-02 PROCEDURE — G2211 COMPLEX E/M VISIT ADD ON: CPT

## 2024-07-02 PROCEDURE — 93000 ELECTROCARDIOGRAM COMPLETE: CPT

## 2024-07-02 RX ORDER — TIRZEPATIDE 2.5 MG/.5ML
2.5 INJECTION, SOLUTION SUBCUTANEOUS
Refills: 0 | Status: ACTIVE | COMMUNITY

## 2024-09-05 ENCOUNTER — NON-APPOINTMENT (OUTPATIENT)
Age: 49
End: 2024-09-05

## 2024-09-06 DIAGNOSIS — M79.672 PAIN IN LEFT FOOT: ICD-10-CM

## 2024-09-06 DIAGNOSIS — M79.671 PAIN IN RIGHT FOOT: ICD-10-CM

## 2024-09-06 DIAGNOSIS — M54.16 RADICULOPATHY, LUMBAR REGION: ICD-10-CM

## 2024-09-06 DIAGNOSIS — E11.40 TYPE 2 DIABETES MELLITUS WITH DIABETIC NEUROPATHY, UNSPECIFIED: ICD-10-CM

## 2024-09-06 DIAGNOSIS — L60.0 INGROWING NAIL: ICD-10-CM

## 2024-09-06 DIAGNOSIS — B35.1 TINEA UNGUIUM: ICD-10-CM

## 2024-09-06 RX ORDER — DICLOFENAC SODIUM 10 MG/G
1 GEL TOPICAL
Refills: 0 | Status: ACTIVE | COMMUNITY

## 2024-09-06 RX ORDER — GABAPENTIN 300 MG/1
300 CAPSULE ORAL
Refills: 0 | Status: ACTIVE | COMMUNITY

## 2024-09-25 LAB — HBA1C MFR BLD HPLC: 5.8

## 2024-09-30 ENCOUNTER — APPOINTMENT (OUTPATIENT)
Age: 49
End: 2024-09-30

## 2024-10-06 ENCOUNTER — OFFICE (OUTPATIENT)
Dept: URBAN - METROPOLITAN AREA CLINIC 94 | Facility: CLINIC | Age: 49
Setting detail: OPHTHALMOLOGY
End: 2024-10-06
Payer: MEDICAID

## 2024-10-06 DIAGNOSIS — D21.0: ICD-10-CM

## 2024-10-06 DIAGNOSIS — H01.002: ICD-10-CM

## 2024-10-06 DIAGNOSIS — H25.13: ICD-10-CM

## 2024-10-06 DIAGNOSIS — H11.152: ICD-10-CM

## 2024-10-06 DIAGNOSIS — H04.123: ICD-10-CM

## 2024-10-06 DIAGNOSIS — H01.004: ICD-10-CM

## 2024-10-06 DIAGNOSIS — H01.005: ICD-10-CM

## 2024-10-06 DIAGNOSIS — H43.393: ICD-10-CM

## 2024-10-06 DIAGNOSIS — H01.001: ICD-10-CM

## 2024-10-06 PROCEDURE — 92250 FUNDUS PHOTOGRAPHY W/I&R: CPT | Performed by: REGISTERED NURSE

## 2024-10-06 PROCEDURE — 99213 OFFICE O/P EST LOW 20 MIN: CPT | Performed by: REGISTERED NURSE

## 2024-10-06 ASSESSMENT — REFRACTION_CURRENTRX
OD_SPHERE: +2.25
OD_VPRISM_DIRECTION: SV
OD_OVR_VA: 20/
OS_SPHERE: +2.25
OS_VPRISM_DIRECTION: SV
OS_OVR_VA: 20/

## 2024-10-06 ASSESSMENT — KERATOMETRY
OS_K1POWER_DIOPTERS: 43.75
OD_K2POWER_DIOPTERS: 45.00
METHOD_AUTO_MANUAL: AUTO
OS_AXISANGLE_DEGREES: 079
OD_K1POWER_DIOPTERS: 43.75
OD_AXISANGLE_DEGREES: 085
OS_K2POWER_DIOPTERS: 44.75

## 2024-10-06 ASSESSMENT — REFRACTION_MANIFEST
OS_CYLINDER: SPH
OD_VA1: 20/25
OS_SPHERE: +0.25
OS_AXIS: 135
OS_CYLINDER: SPHERE
OD_CYLINDER: SPHERE
OS_SPHERE: PLANO
OD_SPHERE: PLANO
OS_SPHERE: +1.75
OD_SPHERE: +2.00
OD_VA1: 20/30
OS_VA1: 20/30
OS_ADD: +1.50
OS_VA1: 20/25
OS_VA1: 20/25-
OD_ADD: +1.50
OS_CYLINDER: -0.75

## 2024-10-06 ASSESSMENT — CONFRONTATIONAL VISUAL FIELD TEST (CVF)
OD_FINDINGS: FULL
OS_FINDINGS: FULL

## 2024-10-06 ASSESSMENT — VISUAL ACUITY
OS_BCVA: 20/20
OD_BCVA: 20/25

## 2024-10-06 ASSESSMENT — INCREASING TEAR LAKE - SEVERITY SCORE
OD_INC_TEARLAKE: 2+
OS_INC_TEARLAKE: 2+

## 2024-10-06 ASSESSMENT — REFRACTION_AUTOREFRACTION
OS_CYLINDER: -0.50
OD_SPHERE: +1.00
OS_AXIS: 140
OD_CYLINDER: -0.25
OD_AXIS: 031
OS_SPHERE: +1.00

## 2024-10-06 ASSESSMENT — LID EXAM ASSESSMENTS
OS_BLEPHARITIS: LLL LUL 2+ 3+
OD_BLEPHARITIS: RLL RUL 2+ 3+

## 2024-10-06 ASSESSMENT — DRY EYES - PHYSICIAN NOTES: OD_GENERALCOMMENTS: GRADE 1 STAINING

## 2024-10-06 ASSESSMENT — TONOMETRY
OS_IOP_MMHG: 14
OD_IOP_MMHG: 15

## 2024-10-15 ENCOUNTER — OFFICE (OUTPATIENT)
Dept: URBAN - METROPOLITAN AREA CLINIC 94 | Facility: CLINIC | Age: 49
Setting detail: OPHTHALMOLOGY
End: 2024-10-15
Payer: MEDICAID

## 2024-10-15 ENCOUNTER — RX ONLY (RX ONLY)
Age: 49
End: 2024-10-15

## 2024-10-15 DIAGNOSIS — H01.005: ICD-10-CM

## 2024-10-15 DIAGNOSIS — H01.002: ICD-10-CM

## 2024-10-15 DIAGNOSIS — D21.0: ICD-10-CM

## 2024-10-15 DIAGNOSIS — H01.004: ICD-10-CM

## 2024-10-15 DIAGNOSIS — H01.001: ICD-10-CM

## 2024-10-15 PROCEDURE — 67810 INCAL BX EYELID SKN LID MRGN: CPT | Mod: E1 | Performed by: OPHTHALMOLOGY

## 2024-10-15 PROCEDURE — 92285 EXTERNAL OCULAR PHOTOGRAPHY: CPT | Performed by: OPHTHALMOLOGY

## 2024-10-15 PROCEDURE — 92012 INTRM OPH EXAM EST PATIENT: CPT | Mod: 25 | Performed by: OPHTHALMOLOGY

## 2024-10-15 ASSESSMENT — REFRACTION_MANIFEST
OS_AXIS: 135
OD_CYLINDER: SPHERE
OS_CYLINDER: SPH
OS_SPHERE: PLANO
OS_SPHERE: +1.75
OD_VA1: 20/25
OS_SPHERE: +0.25
OS_VA1: 20/25-
OS_CYLINDER: -0.75
OD_SPHERE: +2.00
OS_VA1: 20/30
OD_SPHERE: PLANO
OS_CYLINDER: SPHERE
OS_ADD: +1.50
OS_VA1: 20/25
OD_ADD: +1.50
OD_VA1: 20/30

## 2024-10-15 ASSESSMENT — REFRACTION_CURRENTRX
OS_VPRISM_DIRECTION: SV
OD_OVR_VA: 20/
OD_VPRISM_DIRECTION: SV
OS_SPHERE: +2.25
OD_SPHERE: +2.25
OS_OVR_VA: 20/

## 2024-10-15 ASSESSMENT — KERATOMETRY
OD_K2POWER_DIOPTERS: 45.00
OD_AXISANGLE_DEGREES: 085
METHOD_AUTO_MANUAL: AUTO
OS_K1POWER_DIOPTERS: 43.75
OD_K1POWER_DIOPTERS: 43.75
OS_AXISANGLE_DEGREES: 079
OS_K2POWER_DIOPTERS: 44.75

## 2024-10-15 ASSESSMENT — TONOMETRY
OS_IOP_MMHG: 17
OD_IOP_MMHG: 16

## 2024-10-15 ASSESSMENT — DRY EYES - PHYSICIAN NOTES: OD_GENERALCOMMENTS: GRADE 1 STAINING

## 2024-10-15 ASSESSMENT — REFRACTION_AUTOREFRACTION
OS_SPHERE: +1.00
OD_SPHERE: +1.00
OD_AXIS: 031
OD_CYLINDER: -0.25
OS_CYLINDER: -0.50
OS_AXIS: 140

## 2024-10-15 ASSESSMENT — INCREASING TEAR LAKE - SEVERITY SCORE
OS_INC_TEARLAKE: 2+
OD_INC_TEARLAKE: 2+

## 2024-10-15 ASSESSMENT — VISUAL ACUITY
OD_BCVA: 20/25
OS_BCVA: 20/20

## 2024-10-15 ASSESSMENT — LID EXAM ASSESSMENTS
OD_BLEPHARITIS: RLL RUL 2+ 3+
OS_BLEPHARITIS: LLL LUL 2+ 3+

## 2024-10-29 ENCOUNTER — OFFICE (OUTPATIENT)
Dept: URBAN - METROPOLITAN AREA CLINIC 94 | Facility: CLINIC | Age: 49
Setting detail: OPHTHALMOLOGY
End: 2024-10-29
Payer: MEDICAID

## 2024-10-29 DIAGNOSIS — H01.001: ICD-10-CM

## 2024-10-29 DIAGNOSIS — H01.005: ICD-10-CM

## 2024-10-29 DIAGNOSIS — H11.152: ICD-10-CM

## 2024-10-29 DIAGNOSIS — H01.004: ICD-10-CM

## 2024-10-29 DIAGNOSIS — H01.002: ICD-10-CM

## 2024-10-29 DIAGNOSIS — D21.0: ICD-10-CM

## 2024-10-29 PROCEDURE — 92012 INTRM OPH EXAM EST PATIENT: CPT | Performed by: OPHTHALMOLOGY

## 2024-10-29 ASSESSMENT — KERATOMETRY
OS_K1POWER_DIOPTERS: 43.75
OD_K2POWER_DIOPTERS: 45.00
OS_K2POWER_DIOPTERS: 44.75
METHOD_AUTO_MANUAL: AUTO
OD_AXISANGLE_DEGREES: 085
OS_AXISANGLE_DEGREES: 079
OD_K1POWER_DIOPTERS: 43.75

## 2024-10-29 ASSESSMENT — REFRACTION_MANIFEST
OD_ADD: +1.50
OD_SPHERE: +2.00
OS_CYLINDER: -0.75
OS_ADD: +1.50
OS_VA1: 20/25
OD_VA1: 20/25
OS_CYLINDER: SPHERE
OS_SPHERE: PLANO
OS_VA1: 20/30
OD_CYLINDER: SPHERE
OD_VA1: 20/30
OS_SPHERE: +0.25
OS_CYLINDER: SPH
OS_AXIS: 135
OS_SPHERE: +1.75
OD_SPHERE: PLANO
OS_VA1: 20/25-

## 2024-10-29 ASSESSMENT — REFRACTION_AUTOREFRACTION
OD_SPHERE: +1.00
OS_SPHERE: +1.00
OD_AXIS: 031
OD_CYLINDER: -0.25
OS_AXIS: 140
OS_CYLINDER: -0.50

## 2024-10-29 ASSESSMENT — CONFRONTATIONAL VISUAL FIELD TEST (CVF)
OS_FINDINGS: FULL
OD_FINDINGS: FULL

## 2024-10-29 ASSESSMENT — INCREASING TEAR LAKE - SEVERITY SCORE
OS_INC_TEARLAKE: 2+
OD_INC_TEARLAKE: 2+

## 2024-10-29 ASSESSMENT — DRY EYES - PHYSICIAN NOTES: OD_GENERALCOMMENTS: GRADE 1 STAINING

## 2024-10-29 ASSESSMENT — LID EXAM ASSESSMENTS
OD_BLEPHARITIS: RLL RUL 2+ 3+
OS_BLEPHARITIS: LLL LUL 2+ 3+

## 2024-10-29 ASSESSMENT — VISUAL ACUITY
OS_BCVA: 20/20
OD_BCVA: 20/25

## 2024-10-29 ASSESSMENT — TONOMETRY
OS_IOP_MMHG: 15
OD_IOP_MMHG: 15

## 2024-12-03 ENCOUNTER — OFFICE (OUTPATIENT)
Dept: URBAN - METROPOLITAN AREA CLINIC 94 | Facility: CLINIC | Age: 49
Setting detail: OPHTHALMOLOGY
End: 2024-12-03
Payer: MEDICAID

## 2024-12-03 DIAGNOSIS — H01.004: ICD-10-CM

## 2024-12-03 DIAGNOSIS — H11.152: ICD-10-CM

## 2024-12-03 DIAGNOSIS — H01.002: ICD-10-CM

## 2024-12-03 DIAGNOSIS — H01.001: ICD-10-CM

## 2024-12-03 DIAGNOSIS — H04.123: ICD-10-CM

## 2024-12-03 DIAGNOSIS — H01.005: ICD-10-CM

## 2024-12-03 PROCEDURE — 92012 INTRM OPH EXAM EST PATIENT: CPT | Performed by: OPHTHALMOLOGY

## 2024-12-03 ASSESSMENT — KERATOMETRY
OS_AXISANGLE_DEGREES: 079
OS_K2POWER_DIOPTERS: 44.75
OS_K1POWER_DIOPTERS: 43.75
OD_K2POWER_DIOPTERS: 45.00
OD_AXISANGLE_DEGREES: 085
OD_K1POWER_DIOPTERS: 43.75
METHOD_AUTO_MANUAL: AUTO

## 2024-12-03 ASSESSMENT — REFRACTION_CURRENTRX
OS_OVR_VA: 20/
OD_OVR_VA: 20/
OS_VPRISM_DIRECTION: SV
OD_VPRISM_DIRECTION: SV
OD_SPHERE: +2.25
OS_SPHERE: +2.25

## 2024-12-03 ASSESSMENT — VISUAL ACUITY
OD_BCVA: 20/25
OS_BCVA: 20/20

## 2024-12-03 ASSESSMENT — REFRACTION_MANIFEST
OS_CYLINDER: SPHERE
OD_SPHERE: PLANO
OD_SPHERE: +2.00
OD_VA1: 20/25
OD_CYLINDER: SPHERE
OS_SPHERE: PLANO
OS_ADD: +1.50
OS_SPHERE: +1.75
OS_CYLINDER: SPH
OS_VA1: 20/25-
OD_ADD: +1.50
OD_VA1: 20/30
OS_AXIS: 135
OS_SPHERE: +0.25
OS_VA1: 20/30
OS_CYLINDER: -0.75
OS_VA1: 20/25

## 2024-12-03 ASSESSMENT — LID EXAM ASSESSMENTS
OD_BLEPHARITIS: RLL RUL 2+ 3+
OS_BLEPHARITIS: LLL LUL 2+ 3+

## 2024-12-03 ASSESSMENT — TONOMETRY: OS_IOP_MMHG: 16

## 2024-12-03 ASSESSMENT — REFRACTION_AUTOREFRACTION
OS_CYLINDER: -0.50
OD_SPHERE: +1.00
OS_AXIS: 140
OS_SPHERE: +1.00
OD_CYLINDER: -0.25
OD_AXIS: 031

## 2024-12-03 ASSESSMENT — DRY EYES - PHYSICIAN NOTES: OD_GENERALCOMMENTS: GRADE 1 STAINING

## 2024-12-03 ASSESSMENT — INCREASING TEAR LAKE - SEVERITY SCORE
OS_INC_TEARLAKE: 2+
OD_INC_TEARLAKE: 2+

## 2024-12-17 ENCOUNTER — NON-APPOINTMENT (OUTPATIENT)
Age: 49
End: 2024-12-17

## 2024-12-17 ENCOUNTER — APPOINTMENT (OUTPATIENT)
Dept: CARDIOLOGY | Facility: CLINIC | Age: 49
End: 2024-12-17
Payer: COMMERCIAL

## 2024-12-17 VITALS
WEIGHT: 203 LBS | HEIGHT: 63 IN | BODY MASS INDEX: 35.97 KG/M2 | HEART RATE: 85 BPM | SYSTOLIC BLOOD PRESSURE: 100 MMHG | DIASTOLIC BLOOD PRESSURE: 70 MMHG | RESPIRATION RATE: 16 BRPM

## 2024-12-17 VITALS — SYSTOLIC BLOOD PRESSURE: 120 MMHG | DIASTOLIC BLOOD PRESSURE: 80 MMHG

## 2024-12-17 DIAGNOSIS — E66.811 OBESITY, CLASS 1: ICD-10-CM

## 2024-12-17 DIAGNOSIS — E11.40 TYPE 2 DIABETES MELLITUS WITH DIABETIC NEUROPATHY, UNSPECIFIED: ICD-10-CM

## 2024-12-17 PROCEDURE — 99214 OFFICE O/P EST MOD 30 MIN: CPT

## 2024-12-17 PROCEDURE — 93000 ELECTROCARDIOGRAM COMPLETE: CPT

## 2024-12-17 PROCEDURE — G2211 COMPLEX E/M VISIT ADD ON: CPT

## 2025-03-27 ENCOUNTER — OFFICE (OUTPATIENT)
Dept: URBAN - METROPOLITAN AREA CLINIC 94 | Facility: CLINIC | Age: 50
Setting detail: OPHTHALMOLOGY
End: 2025-03-27
Payer: COMMERCIAL

## 2025-03-27 DIAGNOSIS — H25.13: ICD-10-CM

## 2025-03-27 DIAGNOSIS — H40.033: ICD-10-CM

## 2025-03-27 DIAGNOSIS — H43.393: ICD-10-CM

## 2025-03-27 PROCEDURE — 92012 INTRM OPH EXAM EST PATIENT: CPT | Performed by: OPHTHALMOLOGY

## 2025-03-27 PROCEDURE — 92250 FUNDUS PHOTOGRAPHY W/I&R: CPT | Performed by: OPHTHALMOLOGY

## 2025-03-27 ASSESSMENT — VISUAL ACUITY
OS_BCVA: 20/25
OD_BCVA: 20/25

## 2025-03-27 ASSESSMENT — KERATOMETRY
OD_K1POWER_DIOPTERS: 43.75
METHOD_AUTO_MANUAL: AUTO
OS_AXISANGLE_DEGREES: 080
OD_AXISANGLE_DEGREES: 085
OS_K2POWER_DIOPTERS: 44.75
OS_K1POWER_DIOPTERS: 44.00
OD_K2POWER_DIOPTERS: 44.75

## 2025-03-27 ASSESSMENT — REFRACTION_MANIFEST
OD_VA1: 20/30
OD_VA1: 20/25
OS_SPHERE: +1.75
OS_AXIS: 135
OS_SPHERE: PLANO
OS_CYLINDER: SPHERE
OS_SPHERE: +0.25
OS_CYLINDER: SPH
OD_SPHERE: PLANO
OD_SPHERE: +2.00
OD_CYLINDER: SPHERE
OS_VA1: 20/25
OS_VA1: 20/30
OS_VA1: 20/25-
OD_ADD: +1.50
OS_CYLINDER: -0.75
OS_ADD: +1.50

## 2025-03-27 ASSESSMENT — DRY EYES - PHYSICIAN NOTES: OD_GENERALCOMMENTS: GRADE 1 STAINING

## 2025-03-27 ASSESSMENT — TONOMETRY
OD_IOP_MMHG: 15
OD_IOP_MMHG: 17
OS_IOP_MMHG: 18
OS_IOP_MMHG: 15

## 2025-03-27 ASSESSMENT — INCREASING TEAR LAKE - SEVERITY SCORE
OD_INC_TEARLAKE: 2+
OS_INC_TEARLAKE: 2+

## 2025-03-27 ASSESSMENT — REFRACTION_AUTOREFRACTION
OD_CYLINDER: -0.25
OD_SPHERE: +1.00
OS_CYLINDER: -0.50
OS_AXIS: 130
OS_SPHERE: +1.00
OD_AXIS: 060

## 2025-03-27 ASSESSMENT — LID EXAM ASSESSMENTS
OS_BLEPHARITIS: LLL LUL 2+ 3+
OD_BLEPHARITIS: RLL RUL 2+ 3+

## 2025-03-27 ASSESSMENT — REFRACTION_CURRENTRX
OS_OVR_VA: 20/
OD_OVR_VA: 20/
OS_VPRISM_DIRECTION: SV
OD_SPHERE: +2.25
OD_VPRISM_DIRECTION: SV
OS_SPHERE: +2.25

## 2025-03-27 ASSESSMENT — CONFRONTATIONAL VISUAL FIELD TEST (CVF)
OD_FINDINGS: FULL
OS_FINDINGS: FULL

## 2025-06-10 ENCOUNTER — APPOINTMENT (OUTPATIENT)
Dept: CARDIOLOGY | Facility: CLINIC | Age: 50
End: 2025-06-10
Payer: COMMERCIAL

## 2025-06-10 VITALS
DIASTOLIC BLOOD PRESSURE: 70 MMHG | SYSTOLIC BLOOD PRESSURE: 118 MMHG | BODY MASS INDEX: 35.26 KG/M2 | RESPIRATION RATE: 16 BRPM | WEIGHT: 199 LBS | HEIGHT: 63 IN | HEART RATE: 70 BPM

## 2025-06-10 PROBLEM — R53.83 FATIGUE, UNSPECIFIED TYPE: Status: ACTIVE | Noted: 2025-06-10

## 2025-06-10 PROCEDURE — 93000 ELECTROCARDIOGRAM COMPLETE: CPT

## 2025-06-10 PROCEDURE — 99214 OFFICE O/P EST MOD 30 MIN: CPT

## 2025-06-10 RX ORDER — SEMAGLUTIDE 1.34 MG/ML
2 INJECTION, SOLUTION SUBCUTANEOUS
Refills: 0 | Status: ACTIVE | COMMUNITY

## 2025-07-18 ENCOUNTER — APPOINTMENT (OUTPATIENT)
Dept: CARDIOLOGY | Facility: CLINIC | Age: 50
End: 2025-07-18

## 2025-07-21 ENCOUNTER — APPOINTMENT (OUTPATIENT)
Dept: CARDIOLOGY | Facility: CLINIC | Age: 50
End: 2025-07-21

## 2025-07-29 ENCOUNTER — APPOINTMENT (OUTPATIENT)
Dept: CARDIOLOGY | Facility: CLINIC | Age: 50
End: 2025-07-29
Payer: COMMERCIAL

## 2025-07-29 VITALS
HEIGHT: 63 IN | DIASTOLIC BLOOD PRESSURE: 70 MMHG | WEIGHT: 204 LBS | SYSTOLIC BLOOD PRESSURE: 110 MMHG | BODY MASS INDEX: 36.14 KG/M2 | HEART RATE: 85 BPM

## 2025-07-29 DIAGNOSIS — R53.82 CHRONIC FATIGUE, UNSPECIFIED: ICD-10-CM

## 2025-07-29 DIAGNOSIS — G47.33 OBSTRUCTIVE SLEEP APNEA (ADULT) (PEDIATRIC): ICD-10-CM

## 2025-07-29 DIAGNOSIS — E66.811 OBESITY, CLASS 1: ICD-10-CM

## 2025-07-29 DIAGNOSIS — E11.9 TYPE 2 DIABETES MELLITUS W/OUT COMPLICATIONS: ICD-10-CM

## 2025-07-29 DIAGNOSIS — R00.2 PALPITATIONS: ICD-10-CM

## 2025-07-29 PROCEDURE — 99214 OFFICE O/P EST MOD 30 MIN: CPT

## 2025-07-29 PROCEDURE — 93000 ELECTROCARDIOGRAM COMPLETE: CPT

## 2025-08-01 PROCEDURE — 93228 REMOTE 30 DAY ECG REV/REPORT: CPT

## 2025-08-21 ENCOUNTER — APPOINTMENT (OUTPATIENT)
Dept: CARDIOLOGY | Facility: CLINIC | Age: 50
End: 2025-08-21
Payer: COMMERCIAL

## 2025-08-21 VITALS
WEIGHT: 207 LBS | HEIGHT: 63 IN | BODY MASS INDEX: 36.68 KG/M2 | HEART RATE: 88 BPM | DIASTOLIC BLOOD PRESSURE: 81 MMHG | SYSTOLIC BLOOD PRESSURE: 121 MMHG

## 2025-08-21 DIAGNOSIS — R06.02 SHORTNESS OF BREATH: ICD-10-CM

## 2025-08-21 DIAGNOSIS — R53.82 CHRONIC FATIGUE, UNSPECIFIED: ICD-10-CM

## 2025-08-21 DIAGNOSIS — E11.40 TYPE 2 DIABETES MELLITUS WITH DIABETIC NEUROPATHY, UNSPECIFIED: ICD-10-CM

## 2025-08-21 DIAGNOSIS — R00.2 PALPITATIONS: ICD-10-CM

## 2025-08-21 DIAGNOSIS — E11.9 TYPE 2 DIABETES MELLITUS W/OUT COMPLICATIONS: ICD-10-CM

## 2025-08-21 DIAGNOSIS — E66.811 OBESITY, CLASS 1: ICD-10-CM

## 2025-08-21 DIAGNOSIS — R06.09 OTHER FORMS OF DYSPNEA: ICD-10-CM

## 2025-08-21 DIAGNOSIS — G47.33 OBSTRUCTIVE SLEEP APNEA (ADULT) (PEDIATRIC): ICD-10-CM

## 2025-08-21 PROCEDURE — 93000 ELECTROCARDIOGRAM COMPLETE: CPT

## 2025-08-21 PROCEDURE — 99214 OFFICE O/P EST MOD 30 MIN: CPT

## 2025-09-19 ENCOUNTER — APPOINTMENT (OUTPATIENT)
Dept: CARDIOLOGY | Facility: CLINIC | Age: 50
End: 2025-09-19

## (undated) DEVICE — DRSG STERISTRIPS 0.5 X 4"

## (undated) DEVICE — VENODYNE/SCD SLEEVE CALF MEDIUM

## (undated) DEVICE — SUT SILK 4-0 30" TIES

## (undated) DEVICE — Device

## (undated) DEVICE — SUT SILK 2-0 30" TIES

## (undated) DEVICE — SUT SILK 3-0 30" TIES

## (undated) DEVICE — SOL IRR POUR H2O 1000ML

## (undated) DEVICE — SUT ETHILON 4-0 18" PS-2

## (undated) DEVICE — BEAVER BLADE MINI SHARP ALL ROUND (BLUE)

## (undated) DEVICE — DRSG CURITY GAUZE SPONGE 4 X 4" 12-PLY

## (undated) DEVICE — DRSG STOCKINETTE TUBULAR COTTON 2PLY 6X60"

## (undated) DEVICE — SYR CONTROL LUER LOK 10CC

## (undated) DEVICE — SOL IRR POUR NS 0.9% 1000ML

## (undated) DEVICE — GLV 7.5 PROTEXIS (WHITE)

## (undated) DEVICE — PACK EXTREMITY

## (undated) DEVICE — DRSG KERLIX ROLL 4.5"

## (undated) DEVICE — DRSG ACE BANDAGE 6"

## (undated) DEVICE — NDL HYPO REGULAR BEVEL 25G X 1.5" (BLUE)

## (undated) DEVICE — WARMING BLANKET UPPER ADULT